# Patient Record
Sex: MALE | Race: WHITE | NOT HISPANIC OR LATINO | Employment: OTHER | URBAN - METROPOLITAN AREA
[De-identification: names, ages, dates, MRNs, and addresses within clinical notes are randomized per-mention and may not be internally consistent; named-entity substitution may affect disease eponyms.]

---

## 2022-04-11 ENCOUNTER — HISTORICAL (OUTPATIENT)
Dept: ADMINISTRATIVE | Facility: HOSPITAL | Age: 67
End: 2022-04-11

## 2022-04-29 VITALS
DIASTOLIC BLOOD PRESSURE: 80 MMHG | WEIGHT: 222.88 LBS | BODY MASS INDEX: 33.78 KG/M2 | SYSTOLIC BLOOD PRESSURE: 124 MMHG | HEIGHT: 68 IN

## 2022-05-24 DIAGNOSIS — M51.36 DEGENERATION, INTERVERTEBRAL DISC, LUMBAR: Primary | ICD-10-CM

## 2022-06-09 ENCOUNTER — OFFICE VISIT (OUTPATIENT)
Dept: ORTHOPEDICS | Facility: CLINIC | Age: 67
End: 2022-06-09
Payer: OTHER GOVERNMENT

## 2022-06-09 VITALS
WEIGHT: 215 LBS | HEIGHT: 69 IN | HEART RATE: 78 BPM | BODY MASS INDEX: 31.84 KG/M2 | SYSTOLIC BLOOD PRESSURE: 142 MMHG | DIASTOLIC BLOOD PRESSURE: 77 MMHG

## 2022-06-09 DIAGNOSIS — M54.9 CHRONIC BACK PAIN GREATER THAN 3 MONTHS DURATION: Primary | ICD-10-CM

## 2022-06-09 DIAGNOSIS — G89.29 CHRONIC BACK PAIN GREATER THAN 3 MONTHS DURATION: Primary | ICD-10-CM

## 2022-06-09 DIAGNOSIS — M47.816 LUMBAR SPONDYLOSIS: ICD-10-CM

## 2022-06-09 DIAGNOSIS — M51.36 DEGENERATION, INTERVERTEBRAL DISC, LUMBAR: ICD-10-CM

## 2022-06-09 PROBLEM — M51.369 DEGENERATION, INTERVERTEBRAL DISC, LUMBAR: Status: ACTIVE | Noted: 2022-06-09

## 2022-06-09 PROCEDURE — 96372 THER/PROPH/DIAG INJ SC/IM: CPT | Mod: ,,, | Performed by: ANESTHESIOLOGY

## 2022-06-09 PROCEDURE — 96372 PR INJECTION,THERAP/PROPH/DIAG2ST, IM OR SUBCUT: ICD-10-PCS | Mod: ,,, | Performed by: ANESTHESIOLOGY

## 2022-06-09 PROCEDURE — 99203 PR OFFICE/OUTPT VISIT, NEW, LEVL III, 30-44 MIN: ICD-10-PCS | Mod: 25,,, | Performed by: ANESTHESIOLOGY

## 2022-06-09 PROCEDURE — 99203 OFFICE O/P NEW LOW 30 MIN: CPT | Mod: 25,,, | Performed by: ANESTHESIOLOGY

## 2022-06-09 RX ORDER — GLIPIZIDE 10 MG/1
10 TABLET ORAL
COMMUNITY

## 2022-06-09 RX ORDER — KETOROLAC TROMETHAMINE 30 MG/ML
60 INJECTION, SOLUTION INTRAMUSCULAR; INTRAVENOUS
Status: COMPLETED | OUTPATIENT
Start: 2022-06-09 | End: 2022-06-09

## 2022-06-09 RX ORDER — METHOCARBAMOL 750 MG/1
750 TABLET, FILM COATED ORAL EVERY 8 HOURS PRN
COMMUNITY
Start: 2022-03-02 | End: 2023-11-20

## 2022-06-09 RX ADMIN — KETOROLAC TROMETHAMINE 60 MG: 30 INJECTION, SOLUTION INTRAMUSCULAR; INTRAVENOUS at 03:06

## 2022-06-09 NOTE — PROGRESS NOTES
Pauline Lala MD        PATIENT NAME: Wen Morris  : 1955  DATE: 22  MRN: [id number]      Billing Provider: Pauline Lala MD  Level of Service:   Patient PCP Information     Provider PCP Type    Naya Whittaker MD General          Reason for Visit / Chief Complaint: Back Pain ([facility]; [facility]. Pt states prior injury/sx; neck sx 22. Pt c/o constant pain; wears back brace daily. Pain level 6 out of 10. Pt did not bring list/meds. )       Update PCP  Update Chief Complaint         History of Present Illness / Problem Focused Workflow     Wen Morris presents to the clinic with Back Pain ([facility]; [facility] back pain. Pt states prior injury/sx; neck sx 22. Pt c/o constant pain; wears back brace daily. Pain level 6 out of 10. Pt did not bring list/meds. )     LBP for many years; had MELI years ago but steroid caused elevated glucose difficult to control  Last had toradol IM that helped for a couple of months    Back Pain        Review of Systems     Review of Systems   Musculoskeletal: Positive for back pain.   All other systems reviewed and are negative.       Medical / Social / Family History   History reviewed. No pertinent past medical history.    History reviewed. No pertinent surgical history.    Social History  Mr. Morris  reports that he has quit smoking. His smoking use included cigarettes. He has never used smokeless tobacco.    Family History  Mr.'s Morris family history is not on file.    Medications and Allergies     Medications  Outpatient Medications Marked as Taking for the 22 encounter (Office Visit) with Pauline Lala MD   Medication Sig Dispense Refill    glipiZIDE (GLUCOTROL) 10 MG tablet Take 10 mg by mouth 2 (two) times daily before meals.      methocarbamoL (ROBAXIN) 750 MG Tab Take 750 mg by mouth every 8 (eight) hours as needed.         Allergies  Review of patient's allergies indicates:   Allergen Reactions    Corticosteroids  (glucocorticoids) Anxiety       Physical Examination     Vitals:    06/09/22 1458   BP: (!) 142/77   Pulse: 78     Spine Musculoskeletal Exam    General        Constitutional: appears stated age, well-developed and well-nourished    Scleral icterus: no    Labored breathing: no    Psychiatric: normal mood and affect and no acute distress    Neurological: alert and oriented x3    Skin: intact    Inspection      Leg length disparity: no discrepancy      Thoracolumbar      Erythema: none    Edema (right lower extremity): none    Edema (left lower extremity): none    Ecchymosis: none    Deformity: none    Palpation      Thoracolumbar      Masses: none    Spasms: none    Crepitus: none    Lower extremity muscle tone: normal       Assessment and Plan (including Health Maintenance)      Problem List  Smart Sets  Document Outside HM   :    Plan:   Chronic back pain greater than 3 months duration    Degeneration, intervertebral disc, lumbar  -     Ambulatory referral/consult to Orthopedics    Lumbar spondylosis       Pt states he cannot have steroid injections due to glucose being difficult to control. Toradol IM today, which has given 2 months of relief in past. Will f/u 2 months.    Problem List Items Addressed This Visit        Orthopedic Problems    Degeneration, intervertebral disc, lumbar    Lumbar spondylosis       Other    Chronic back pain greater than 3 months duration - Primary            Future Appointments   Date Time Provider Department Center   8/11/2022  1:15 PM Pauline Lala MD [facility]        There are no Patient Instructions on file for this visit.  No follow-ups on file.     Signature:  Pauline Lala MD      Date of encounter: 6/9/22

## 2022-08-11 ENCOUNTER — OFFICE VISIT (OUTPATIENT)
Dept: ORTHOPEDICS | Facility: CLINIC | Age: 67
End: 2022-08-11
Payer: OTHER GOVERNMENT

## 2022-08-11 VITALS
DIASTOLIC BLOOD PRESSURE: 88 MMHG | BODY MASS INDEX: 32.49 KG/M2 | SYSTOLIC BLOOD PRESSURE: 136 MMHG | HEIGHT: 68 IN | HEART RATE: 74 BPM | WEIGHT: 214.38 LBS

## 2022-08-11 DIAGNOSIS — M54.16 LUMBAR RADICULITIS: ICD-10-CM

## 2022-08-11 DIAGNOSIS — G89.29 CHRONIC BACK PAIN GREATER THAN 3 MONTHS DURATION: Primary | ICD-10-CM

## 2022-08-11 DIAGNOSIS — M51.36 LUMBAR DEGENERATIVE DISC DISEASE: ICD-10-CM

## 2022-08-11 DIAGNOSIS — M47.816 LUMBAR SPONDYLOSIS: ICD-10-CM

## 2022-08-11 DIAGNOSIS — M54.9 CHRONIC BACK PAIN GREATER THAN 3 MONTHS DURATION: Primary | ICD-10-CM

## 2022-08-11 PROBLEM — M51.369 LUMBAR DEGENERATIVE DISC DISEASE: Status: ACTIVE | Noted: 2022-08-11

## 2022-08-11 PROCEDURE — 99213 PR OFFICE/OUTPT VISIT, EST, LEVL III, 20-29 MIN: ICD-10-PCS | Mod: 25,,, | Performed by: ANESTHESIOLOGY

## 2022-08-11 PROCEDURE — 99213 OFFICE O/P EST LOW 20 MIN: CPT | Mod: 25,,, | Performed by: ANESTHESIOLOGY

## 2022-08-11 RX ORDER — KETOROLAC TROMETHAMINE 30 MG/ML
30 INJECTION, SOLUTION INTRAMUSCULAR; INTRAVENOUS
Status: COMPLETED | OUTPATIENT
Start: 2022-08-11 | End: 2022-08-12

## 2022-08-11 NOTE — PROGRESS NOTES
Pauline Lala MD        PATIENT NAME: Wen Morris  : 1955  DATE: 22  MRN: [id number]      Billing Provider: Pauline Lala MD  Level of Service:   Patient PCP Information     Provider PCP Type    Naya Whittaker MD General          Reason for Visit / Chief Complaint: Back Pain (Pt states the shot worked very well for 5 days and after that he is experiencing a lot of pain. Describes the pain as a stabbing pain.  )       Update PCP  Update Chief Complaint         History of Present Illness / Problem Focused Workflow     Wen Morris presents to the clinic with Back Pain (Pt states the shot worked very well for 5 days and after that he is experiencing a lot of pain. Describes the pain as a stabbing pain.  )     This is a 67-year-old male who returns to clinic today for follow-up of his chronic low back pain.  He was last seen here a couple months ago at which time he received a Toradol intramuscular injection.  He states that it worked for about a week, but that all the pain returned.  He previously had a Toradol intramuscular injection that lasted for a couple of months.  He has been trying to avoid epidural steroid injections because they caused his glucose to elevated it was difficult to control.  He is taking Lyrica, Robaxin, and naproxen but states they do not help.  [facility]      Back Pain        Review of Systems     Review of Systems   Musculoskeletal: Positive for back pain.   All other systems reviewed and are negative.       Medical / Social / Family History   History reviewed. No pertinent past medical history.    History reviewed. No pertinent surgical history.    Social History  Mr. Morris  reports that he has quit smoking. His smoking use included cigarettes. He has never used smokeless tobacco.    Family History  Mr.'s Morris family history is not on file.    Medications and Allergies     Medications  No outpatient  medications have been marked as taking for the 8/11/22 encounter (Office Visit) with Pauline Lala MD.       Allergies  Review of patient's allergies indicates:   Allergen Reactions    Corticosteroids (glucocorticoids) Anxiety       Physical Examination     Vitals:    08/11/22 1306   BP: 136/88   Pulse: 74     Spine Musculoskeletal Exam    General        Constitutional: appears stated age, well-developed and well-nourished    Scleral icterus: no    Labored breathing: no    Psychiatric: normal mood and affect and no acute distress    Neurological: alert and oriented x3    Skin: intact    Inspection      Leg length disparity: no discrepancy      Thoracolumbar      Erythema: none    Edema (right lower extremity): none    Edema (left lower extremity): none    Ecchymosis: none    Deformity: none    Palpation      Thoracolumbar      Masses: none    Spasms: none    Crepitus: none    Lower extremity muscle tone: normal       Assessment and Plan (including Health Maintenance)      Problem List  Smart Sets  Document Outside HM   :    Plan:   Chronic back pain greater than 3 months duration    Lumbar spondylosis    Lumbar radiculitis    Lumbar degenerative disc disease       He will receive a Toradol intramuscular injection in the office today for his flare-up of low back pain.  He is supposed to start physical therapy soon.  I will follow up with him upon completion of his physical therapy in 3 months.    Problem List Items Addressed This Visit        Orthopedic Problems    Lumbar spondylosis    Lumbar degenerative disc disease       Other    Chronic back pain greater than 3 months duration - Primary    Lumbar radiculitis            No future appointments.     There are no Patient Instructions on file for this visit.  No follow-ups on file.     Signature:  Pauline Lala MD      Date of encounter: 8/11/22

## 2022-08-12 PROCEDURE — 96372 PR INJECTION,THERAP/PROPH/DIAG2ST, IM OR SUBCUT: ICD-10-PCS | Mod: ,,, | Performed by: ANESTHESIOLOGY

## 2022-08-12 PROCEDURE — 96372 THER/PROPH/DIAG INJ SC/IM: CPT | Mod: ,,, | Performed by: ANESTHESIOLOGY

## 2022-08-12 RX ADMIN — KETOROLAC TROMETHAMINE 30 MG: 30 INJECTION, SOLUTION INTRAMUSCULAR; INTRAVENOUS at 08:08

## 2022-09-27 ENCOUNTER — TELEPHONE (OUTPATIENT)
Dept: NEUROLOGY | Facility: CLINIC | Age: 67
End: 2022-09-27
Payer: OTHER GOVERNMENT

## 2022-09-27 NOTE — TELEPHONE ENCOUNTER
Message from Carol Bernard sent at 9/27/2022  1:25 PM CDT   Regarding: RE: Appointment  LVM & s/w pt's nephew - he will let him know an appt was scheduled and to contact me if needed.      ----- Message -----  From: Kala Marina  Sent: 9/27/2022  11:58 AM CDT  To: Carol Bernard  Subject: Appointment                                      [facility] with Dr. Schwab.     Patient is already established with Dr. Schwab; last seen in 03/2021 for dx: chronic neck pain; needs follow up scheduled.     Authorization has been added to patients chart and scanned into  as well.     Thank you.

## 2022-10-20 ENCOUNTER — TELEPHONE (OUTPATIENT)
Dept: NEUROLOGY | Facility: CLINIC | Age: 67
End: 2022-10-20
Payer: OTHER GOVERNMENT

## 2022-10-20 NOTE — TELEPHONE ENCOUNTER
Pt states he was told by [facility] a records request would need to be sent to [facility] before they can send clinic the CT scan for pt's visit on 10/21/22.    I called the [facility] with number provided by pt [phone].   I spoke to Bella.   Bella states a request can be faxed to [phone].   States request should include a letter head, office address, phone #, fax #, pt name, , what is being requested, reason for request and should be signed.   States they can fax back a report by tomorrow 10/21/22, but not the CD.   States if CD is needed it will have to mailed, but it will not be in by tomorrow 10/21/22.   States pt has option to go into a [facility] location, fill out request and get report today 10/20/22.     I called pt to notify him of [facility] call.   Pt states he will go in tomorrow morning 10/21/22, do request and  report.       LOV: 3/10/21    NOV: 10/21/22

## 2022-10-20 NOTE — TELEPHONE ENCOUNTER
Nothing at the moment.   Pt stated he will go in tomorrow morning 10/21/22, do request and  report from [facility] in [place].

## 2022-10-21 ENCOUNTER — OFFICE VISIT (OUTPATIENT)
Dept: NEUROLOGY | Facility: CLINIC | Age: 67
End: 2022-10-21
Payer: OTHER GOVERNMENT

## 2022-10-21 VITALS
SYSTOLIC BLOOD PRESSURE: 136 MMHG | WEIGHT: 223.19 LBS | BODY MASS INDEX: 33.06 KG/M2 | HEIGHT: 69 IN | DIASTOLIC BLOOD PRESSURE: 72 MMHG

## 2022-10-21 DIAGNOSIS — M54.81 OCCIPITAL NEURALGIA, UNSPECIFIED LATERALITY: Primary | ICD-10-CM

## 2022-10-21 PROCEDURE — 64450 PR NERVE BLOCK INJ, ANES/STEROID, OTHER PERIPHERAL: ICD-10-PCS | Mod: S$PBB,LT,, | Performed by: PSYCHIATRY & NEUROLOGY

## 2022-10-21 PROCEDURE — 99214 OFFICE O/P EST MOD 30 MIN: CPT | Mod: PBBFAC | Performed by: PSYCHIATRY & NEUROLOGY

## 2022-10-21 PROCEDURE — 64405 NJX AA&/STRD GR OCPL NRV: CPT | Mod: PBBFAC,LT | Performed by: PSYCHIATRY & NEUROLOGY

## 2022-10-21 PROCEDURE — 64450 NJX AA&/STRD OTHER PN/BRANCH: CPT | Mod: PBBFAC,LT | Performed by: PSYCHIATRY & NEUROLOGY

## 2022-10-21 PROCEDURE — 64405 NJX AA&/STRD GR OCPL NRV: CPT | Mod: 51,S$PBB,LT, | Performed by: PSYCHIATRY & NEUROLOGY

## 2022-10-21 PROCEDURE — 99999 PR PBB SHADOW E&M-EST. PATIENT-LVL IV: CPT | Mod: PBBFAC,,, | Performed by: PSYCHIATRY & NEUROLOGY

## 2022-10-21 PROCEDURE — 99213 OFFICE O/P EST LOW 20 MIN: CPT | Mod: 25,S$PBB,, | Performed by: PSYCHIATRY & NEUROLOGY

## 2022-10-21 PROCEDURE — 99999 PR PBB SHADOW E&M-EST. PATIENT-LVL IV: ICD-10-PCS | Mod: PBBFAC,,, | Performed by: PSYCHIATRY & NEUROLOGY

## 2022-10-21 PROCEDURE — 64405 PR NERVE BLOCK INJ, ANES/STEROID, OCCIPITAL: ICD-10-PCS | Mod: 51,S$PBB,LT, | Performed by: PSYCHIATRY & NEUROLOGY

## 2022-10-21 PROCEDURE — 64450 NJX AA&/STRD OTHER PN/BRANCH: CPT | Mod: S$PBB,LT,, | Performed by: PSYCHIATRY & NEUROLOGY

## 2022-10-21 PROCEDURE — 99213 PR OFFICE/OUTPT VISIT, EST, LEVL III, 20-29 MIN: ICD-10-PCS | Mod: 25,S$PBB,, | Performed by: PSYCHIATRY & NEUROLOGY

## 2022-10-21 RX ORDER — BUPIVACAINE HYDROCHLORIDE 5 MG/ML
1 INJECTION, SOLUTION PERINEURAL ONCE
Status: COMPLETED | OUTPATIENT
Start: 2022-10-21 | End: 2022-10-21

## 2022-10-21 RX ORDER — IBUPROFEN 100 MG/5ML
1000 SUSPENSION, ORAL (FINAL DOSE FORM) ORAL DAILY
COMMUNITY

## 2022-10-21 RX ORDER — DIVALPROEX SODIUM 250 MG/1
250 TABLET, DELAYED RELEASE ORAL NIGHTLY
COMMUNITY
Start: 2022-09-26 | End: 2023-01-09

## 2022-10-21 RX ORDER — MEMANTINE HYDROCHLORIDE 10 MG/1
5 TABLET ORAL 2 TIMES DAILY
COMMUNITY

## 2022-10-21 RX ORDER — TRIAMCINOLONE ACETONIDE 40 MG/ML
40 INJECTION, SUSPENSION INTRA-ARTICULAR; INTRAMUSCULAR
Status: COMPLETED | OUTPATIENT
Start: 2022-10-21 | End: 2022-10-21

## 2022-10-21 RX ORDER — ZOLPIDEM TARTRATE 10 MG/1
5 TABLET ORAL NIGHTLY
COMMUNITY

## 2022-10-21 RX ORDER — ENTECAVIR 0.5 MG/1
0.5 TABLET, FILM COATED ORAL DAILY
COMMUNITY

## 2022-10-21 RX ORDER — AMOXICILLIN AND CLAVULANATE POTASSIUM 875; 125 MG/1; MG/1
1 TABLET, FILM COATED ORAL EVERY 12 HOURS
COMMUNITY
Start: 2022-10-16 | End: 2023-01-09

## 2022-10-21 RX ORDER — DONEPEZIL HYDROCHLORIDE 10 MG/1
10 TABLET, FILM COATED ORAL NIGHTLY
COMMUNITY

## 2022-10-21 RX ORDER — ASPIRIN 81 MG/1
81 TABLET ORAL DAILY
COMMUNITY
End: 2023-01-09

## 2022-10-21 RX ORDER — TENOFOVIR DISOPROXIL FUMARATE 300 MG/1
300 TABLET, FILM COATED ORAL DAILY
COMMUNITY

## 2022-10-21 RX ORDER — OMEPRAZOLE 20 MG/1
20 CAPSULE, DELAYED RELEASE ORAL DAILY
COMMUNITY

## 2022-10-21 RX ORDER — HYDROCODONE BITARTRATE AND ACETAMINOPHEN 7.5; 325 MG/1; MG/1
1 TABLET ORAL 3 TIMES DAILY PRN
COMMUNITY
Start: 2022-10-19 | End: 2023-01-09

## 2022-10-21 RX ORDER — CYCLOBENZAPRINE HCL 10 MG
10 TABLET ORAL 3 TIMES DAILY PRN
COMMUNITY

## 2022-10-21 RX ORDER — NAPROXEN 500 MG/1
500 TABLET ORAL 2 TIMES DAILY WITH MEALS
COMMUNITY
End: 2023-11-10

## 2022-10-21 RX ORDER — FLUOXETINE HYDROCHLORIDE 20 MG/1
20 CAPSULE ORAL DAILY
COMMUNITY

## 2022-10-21 RX ORDER — CHLORPHENIRAMINE MALEATE 4 MG
4 TABLET ORAL EVERY 6 HOURS PRN
COMMUNITY

## 2022-10-21 RX ADMIN — TRIAMCINOLONE ACETONIDE 40 MG: 40 INJECTION, SUSPENSION INTRA-ARTICULAR; INTRAMUSCULAR at 12:10

## 2022-10-21 RX ADMIN — BUPIVACAINE HYDROCHLORIDE 5 MG: 5 INJECTION, SOLUTION PERINEURAL at 12:10

## 2022-10-21 NOTE — PROGRESS NOTES
Subjective:       Patient ID: Wen Morris is a 67 y.o. male.    Chief Complaint: Headache (C/o daily HA; [facility]) and Chronic Neck Pain    HPI    Neck pain resolved after neck surgery  Chronic, daily headaches L occiput, sharp; can trigger migraine  Largely stopped nsaids  Headaches preceded tenofivir; has been having this for decades  Review of Systems    The remainder of the 14 system ROS is noncontributory or negative unless mentioned/reviewed above.    Objective:      Physical Exam  left greater/lesser ON tender  Mental Status: Alert and oriented x3. Language is fluent with good comprehension.    Cranial Nerve: Pupils are equal, round, and reactive to light. Visual fields are intact to confrontation. Normal fundi. Ocular movements are intact. Face is symmetric at rest and with activation with intact sensation throughout. Hearing intact to finger rub bilaterally. Muscles of tongue and palate activate symmetrically. No dysarthria. Strength is full in sternocleidomastoid and trapezius bilaterally.    Motor: Muscle bulk and tone are normal. Strength is 5/5 in all four extremities both proximally and distally. Intact fine motor movements bilaterally. There is no pronator drift or satelliting on arm roll.    Sensory: Sensation is intact to light touch, pinprick, vibration, and proprioception throughout. Romberg is negative.    Reflexes: 2+ and symmetric at the biceps, triceps, brachioradialis, patella, and Achilles bilaterally. Plantar response is flexor bilaterally.    Coordination: No dysmetria on finger-nose-finger or heel-knee-shin. Normal rapid alternating movements. Fast finger tapping with normal amplitude and speed.    Gait: Narrow based with normal stride length and good arm swing bilaterally. Able to walk on heels, toes, and in tandem.      PROCEDURE NOTE:   Left greater and lesser ONB done with 1 cc kenalog and 1 c cmarcarine total used  Assessment:       Problem List Items Addressed This Visit     None  Visit Diagnoses       Occipital neuralgia, unspecified laterality    -  Primary              Plan:       oNB PRN

## 2022-11-11 ENCOUNTER — OFFICE VISIT (OUTPATIENT)
Dept: ORTHOPEDICS | Facility: CLINIC | Age: 67
End: 2022-11-11
Payer: OTHER GOVERNMENT

## 2022-11-11 DIAGNOSIS — M51.36 DEGENERATION, INTERVERTEBRAL DISC, LUMBAR: Primary | ICD-10-CM

## 2022-11-11 DIAGNOSIS — G89.29 CHRONIC BACK PAIN GREATER THAN 3 MONTHS DURATION: ICD-10-CM

## 2022-11-11 DIAGNOSIS — M54.9 CHRONIC BACK PAIN GREATER THAN 3 MONTHS DURATION: ICD-10-CM

## 2022-11-11 DIAGNOSIS — M47.816 LUMBAR SPONDYLOSIS: ICD-10-CM

## 2022-11-11 DIAGNOSIS — M54.16 LUMBAR RADICULITIS: ICD-10-CM

## 2022-11-11 DIAGNOSIS — M51.36 LUMBAR DEGENERATIVE DISC DISEASE: ICD-10-CM

## 2022-11-11 PROCEDURE — 96372 THER/PROPH/DIAG INJ SC/IM: CPT | Mod: ,,, | Performed by: ANESTHESIOLOGY

## 2022-11-11 PROCEDURE — 96372 PR INJECTION,THERAP/PROPH/DIAG2ST, IM OR SUBCUT: ICD-10-PCS | Mod: ,,, | Performed by: ANESTHESIOLOGY

## 2022-11-11 PROCEDURE — 99213 OFFICE O/P EST LOW 20 MIN: CPT | Mod: 25,,, | Performed by: ANESTHESIOLOGY

## 2022-11-11 PROCEDURE — 99213 PR OFFICE/OUTPT VISIT, EST, LEVL III, 20-29 MIN: ICD-10-PCS | Mod: 25,,, | Performed by: ANESTHESIOLOGY

## 2022-11-11 RX ORDER — KETOROLAC TROMETHAMINE 30 MG/ML
30 INJECTION, SOLUTION INTRAMUSCULAR; INTRAVENOUS
Status: COMPLETED | OUTPATIENT
Start: 2022-11-11 | End: 2022-11-11

## 2022-11-11 RX ADMIN — KETOROLAC TROMETHAMINE 30 MG: 30 INJECTION, SOLUTION INTRAMUSCULAR; INTRAVENOUS at 09:11

## 2022-11-11 NOTE — PROGRESS NOTES
Pauline Lala MD        PATIENT NAME: Wen Morris  : 1955  DATE: 22  MRN: [id number]      Billing Provider: Pauline Lala MD  Level of Service:   Patient PCP Information       Provider PCP Type    Naya Whittaker MD General            Reason for Visit / Chief Complaint: Follow-up (3 month follow up for back pain. Patient states pain today is a 7/10.)       Update PCP  Update Chief Complaint         History of Present Illness / Problem Focused Workflow     Wen Morris presents to the clinic with Follow-up (3 month follow up for back pain. Patient states pain today is a 7/10.)     This is a 67-year-old male who returns to clinic today for follow-up of his chronic low back pain.  He has been trying to avoid epidural steroid injections because they caused his glucose to elevated it was difficult to control.  He is taking Lyrica, Robaxin, and naproxen but states they do not help much.  He was doing physical therapy but states that he ran out of visits.  He had received a Toradol injection at his last appointment which had been helping for quite a while, but has now worn off.  He currently rates his pain as 7/10 on the NRS, which is also the worst it gets too.  Pain had gotten down to 1/10 at best after his Toradol shot.      Back Pain    Follow-up      Review of Systems     Review of Systems   Musculoskeletal:  Positive for back pain.   All other systems reviewed and are negative.     Medical / Social / Family History     Past Medical History:   Diagnosis Date    '     Bell palsy     Chronic neck pain     Headache     Neuralgia        Past Surgical History:   Procedure Laterality Date    CHOLECYSTECTOMY         Social History  Mr. Morris  reports that he quit smoking about 6 years ago. His smoking use included cigarettes. He has never used smokeless tobacco. He reports current alcohol use of about 3.0 - 4.0 standard drinks per week. He reports that he does not use drugs.    Family  History  MrChema's Arturo family history includes Cancer in his father; Diabetes in his mother.    Medications and Allergies     Medications  Outpatient Medications Marked as Taking for the 11/11/22 encounter (Office Visit) with Pauline Lala MD   Medication Sig Dispense Refill    ALPHA LIPOIC ACID ORAL Take by mouth.      ascorbic acid, vitamin C, (VITAMIN C) 1000 MG tablet Take 1,000 mg by mouth once daily.      chlorpheniramine (CHLOR-TRIMETON) 4 mg tablet Take 4 mg by mouth every 6 (six) hours as needed for Allergies.      cyclobenzaprine (FLEXERIL) 10 MG tablet Take 10 mg by mouth 3 (three) times daily as needed for Muscle spasms.      donepeziL (ARICEPT) 10 MG tablet Take 10 mg by mouth every evening.      empagliflozin (JARDIANCE) 25 mg tablet Take 25 mg by mouth once daily.      entecavir (BARACLUDE) 0.5 MG Tab Take 0.5 mg by mouth once daily.      FLUoxetine 20 MG capsule Take 20 mg by mouth once daily.      glipiZIDE (GLUCOTROL) 10 MG tablet Take 10 mg by mouth 2 (two) times daily before meals.      memantine (NAMENDA) 10 MG Tab Take 5 mg by mouth 2 (two) times daily.      methocarbamoL (ROBAXIN) 750 MG Tab Take 750 mg by mouth every 8 (eight) hours as needed.      naproxen (NAPROSYN) 500 MG tablet Take 500 mg by mouth 2 (two) times daily with meals.      omeprazole (PRILOSEC) 20 MG capsule Take 20 mg by mouth once daily.      tenofovir disoproxil fumarate (VIREAD) 300 mg Tab Take 300 mg by mouth once daily.      zolpidem (AMBIEN) 10 mg Tab Take 5 mg by mouth nightly as needed.       Current Facility-Administered Medications for the 11/11/22 encounter (Office Visit) with Pauline Lala MD   Medication Dose Route Frequency Provider Last Rate Last Admin    [COMPLETED] ketorolac injection 30 mg  30 mg Intramuscular 1 time in Clinic/HOD Pauline Lala MD   30 mg at 11/11/22 0948       Allergies  Review of patient's allergies indicates:   Allergen Reactions    Corticosteroids (glucocorticoids) Anxiety        Physical Examination     There were no vitals filed for this visit.    Spine Musculoskeletal Exam    Inspection    Leg length disparity: no discrepancy    Thoracolumbar    Erythema: none    Edema (right lower extremity): none    Edema (left lower extremity): none    Ecchymosis: none    Deformity: none    General      Constitutional: appears stated age, well-developed and well-nourished    Scleral icterus: no    Labored breathing: no    Psychiatric: normal mood and affect and no acute distress    Neurological: alert and oriented x3    Skin: intact     Assessment and Plan (including Health Maintenance)      Problem List  Smart Sets  Document Outside HM   :    Plan:   Degeneration, intervertebral disc, lumbar  -     ketorolac injection 30 mg    Lumbar spondylosis  -     ketorolac injection 30 mg    Lumbar radiculitis  -     ketorolac injection 30 mg    Lumbar degenerative disc disease  -     ketorolac injection 30 mg    Chronic back pain greater than 3 months duration  -     ketorolac injection 30 mg     He will receive a Toradol intramuscular injection in the office today for his flare-up of low back pain.  F/u in 4 months.    Problem List Items Addressed This Visit          Orthopedic Problems    Degeneration, intervertebral disc, lumbar - Primary    Lumbar spondylosis    Lumbar degenerative disc disease       Other    Chronic back pain greater than 3 months duration    Lumbar radiculitis         Future Appointments   Date Time Provider Department Center   3/9/2023  9:45 AM Pauline Lala MD [facility]        There are no Patient Instructions on file for this visit.  No follow-ups on file.     Signature:  Pauline Lala MD      Date of encounter: 11/11/22

## 2023-01-05 ENCOUNTER — TELEPHONE (OUTPATIENT)
Dept: NEUROLOGY | Facility: CLINIC | Age: 68
End: 2023-01-05
Payer: OTHER GOVERNMENT

## 2023-01-05 NOTE — TELEPHONE ENCOUNTER
Pt reports his headaches have returned  States headaches started two and a half weeks ago, are located towards the back of his head and last all day.   Should pt follow up with MD or NP?   I see Dr. Schwab's next available is 2/20/23.      LOV: 10/21/22    NOV: none

## 2023-01-09 ENCOUNTER — CLINICAL SUPPORT (OUTPATIENT)
Dept: NEUROLOGY | Facility: CLINIC | Age: 68
End: 2023-01-09
Payer: OTHER GOVERNMENT

## 2023-01-09 DIAGNOSIS — M54.81 OCCIPITAL NEURALGIA OF LEFT SIDE: Primary | ICD-10-CM

## 2023-01-09 PROCEDURE — 64405 PR NERVE BLOCK INJ, ANES/STEROID, OCCIPITAL: ICD-10-PCS | Mod: 51,S$PBB,LT, | Performed by: NURSE PRACTITIONER

## 2023-01-09 PROCEDURE — 64450 PR NERVE BLOCK INJ, ANES/STEROID, OTHER PERIPHERAL: ICD-10-PCS | Mod: S$PBB,LT,, | Performed by: NURSE PRACTITIONER

## 2023-01-09 PROCEDURE — 99999 PR PBB SHADOW E&M-EST. PATIENT-LVL III: CPT | Mod: PBBFAC,,, | Performed by: NURSE PRACTITIONER

## 2023-01-09 PROCEDURE — 64450 NJX AA&/STRD OTHER PN/BRANCH: CPT | Mod: PBBFAC,LT | Performed by: NURSE PRACTITIONER

## 2023-01-09 PROCEDURE — 99999 PR PBB SHADOW E&M-EST. PATIENT-LVL III: ICD-10-PCS | Mod: PBBFAC,,, | Performed by: NURSE PRACTITIONER

## 2023-01-09 PROCEDURE — 64450 NJX AA&/STRD OTHER PN/BRANCH: CPT | Mod: S$PBB,LT,, | Performed by: NURSE PRACTITIONER

## 2023-01-09 PROCEDURE — 96372 THER/PROPH/DIAG INJ SC/IM: CPT | Mod: PBBFAC

## 2023-01-09 PROCEDURE — 64405 NJX AA&/STRD GR OCPL NRV: CPT | Mod: 51,S$PBB,LT, | Performed by: NURSE PRACTITIONER

## 2023-01-09 RX ORDER — TRIAMCINOLONE ACETONIDE 40 MG/ML
20 INJECTION, SUSPENSION INTRA-ARTICULAR; INTRAMUSCULAR
Status: COMPLETED | OUTPATIENT
Start: 2023-01-09 | End: 2023-01-09

## 2023-01-09 RX ORDER — BUPIVACAINE HYDROCHLORIDE 5 MG/ML
0.5 INJECTION, SOLUTION EPIDURAL; INTRACAUDAL
Status: COMPLETED | OUTPATIENT
Start: 2023-01-09 | End: 2023-01-09

## 2023-01-09 RX ADMIN — BUPIVACAINE HYDROCHLORIDE 2.5 MG: 5 INJECTION, SOLUTION EPIDURAL; INTRACAUDAL at 08:01

## 2023-01-09 RX ADMIN — TRIAMCINOLONE ACETONIDE 20 MG: 40 INJECTION, SUSPENSION INTRA-ARTICULAR; INTRAMUSCULAR at 08:01

## 2023-01-09 NOTE — PROGRESS NOTES
"PROCEDURE:   OCCIPITAL NERVE BLOCK for L occipital neuralgia    L lesser occipital pain & headache returned about 3 weeks ago    L occiput ttp    Requesting onb today    Procedure:  The patient was placed in a seated position. The site of pain and procedure were confirmed with the patient prior to starting the procedure.   The patient's nuchal ridge of the occipital bone was identified and cleaned with alcohol swab.   A 25 gauge 5/8" was advanced through the skin and subcutaneous tissues in the area of the leftlesser occipital nerves.    Aspiration for blood and CSF was negative.      A total of 0.5ml of Bupivacaine 0.5% and 20 mg kenalog was injected.    There were no signs or symptoms of intravascular injection.     No complications were evident.   No specimens collected.    Dr. Liborio Schwab was physically present in the suite if assistance was needed.        "

## 2023-01-09 NOTE — PATIENT INSTRUCTIONS
"               OCCIPITAL NEURALGIA  Neuralgia is a form of neuropathic pain that is characterized by the following features [1-3]:  ?Paroxysmal, brief (seconds to a few minutes), shock- or lightning-like pain that follows a peripheral or cranial nerve distribution and can spread to adjacent areas in the course of the attack.  ?By definition, no objective neurologic deficits are found in the distribution of the affected nerve. (See "Overview of craniofacial pain".)  ?Attacks can be provoked by nonpainful stimulation (allodynia) of trigger points or zones.  ?A refractory period follows attacks; the duration of the refractory period shortens as the disease progresses.  Occipital neuralgia can be a cause of headache in the occipital region. It is described as a unilateral or bilateral paroxysmal shooting or stabbing pain in the posterior part of the scalp, involving the greater, lesser, and/or third occipital nerve distribution, sometimes accompanied by diminished sensation or dysesthesia in the affected area, and commonly associated with tenderness over the involved nerve or nerves [4,5].  This topic will review clinical aspects of occipital neuralgia. Other cranial neuralgias and central causes of facial pain are discussed separately. (See "Overview of craniofacial pain" and "Central neuropathic facial pain" and "Cold stimulus headache" and "Nervus intermedius neuralgia" and "Nummular headache" and "Trigeminal neuralgia".)  ANATOMY -- Sensation in the posterior head and neck is supplied by medial branches of dorsal primary rami of cervical nerve roots C2, C3, C4, and occasionally C5 (figure 1).  The greater occipital nerve originates from the second cervical nerve, perforates the semispinalis capitis and trapezius muscles, and supplies sensation to the uppermost neck and posterior head from the suboccipital area to the vertex.  The third occipital nerve originates from the third cervical spinal nerve, perforates the " trapezius muscle, and supplies sensation to the upper posterior neck and lower occipital region of the scalp, medial to the greater occipital nerve territory.  The lesser occipital nerve is formed by divisions of the second and third cervical nerves, ascends along the posterior margin of the sternocleidomastoid muscle, and provides sensory fibers to the area of the scalp lateral to the greater occipital nerve, behind and superior to the auricle [6-8].  PATHOPHYSIOLOGY -- The pathophysiology of occipital neuralgia is uncertain. One hypothesis is that occipital neuralgia results from injury to the C2-C3 nerve roots and/or occipital nerves through different mechanisms (chronic instability, entrapment, trauma, inflammation) [9]. Some cases develop following Arnold-Chiari malformation surgery or other surgeries at the craniocervical junction and upper cervical spine [10,11]. Some have proposed vascular compression of the nerve by the occipital artery [12-14].  The currently accepted view is that this disorder results from the chronic entrapment of the occipital nerves by the posterior neck and scalp muscles [15-17]. However, there is no compelling evidence to support the concept of irritation of the occipital nerves as the cause [15]. In addition, there is no indisputable evidence from surgical studies to support entrapment of the occipital nerves as the etiology.  Most often, occipital neuralgia develops spontaneously.  EPIDEMIOLOGY -- Occipital neuralgia is thought by many nonspecialists to be common, but it is a relatively rare encounter in clinical practice among headache specialists. The literature on occipital neuralgia is conflicting, and the actual incidence and prevalence are unknown [18]. This may be in part since, quite often, the diagnosis is given to patients with any pain in the occipital region even when diagnostic criteria are not met or even when the pain is not neuropathic. In a population-based study  of adults aged 55 to 94 years, the lifetime prevalence of cranial neuralgias in general was 1.6 percent [19]. In a 10-year prospective registry of patients from a headache outpatient clinic, 1.2 percent of patients were diagnosed with occipital neuralgia and most (80 percent) were female [20].  Injuries such as whiplash during motor vehicle accidents may be a risk factor for occipital neuralgia [8].  CLINICAL MANIFESTATIONS -- The pain of occipital neuralgia has a sudden onset. It is described as severe, stabbing, electric, shock-like, sharp, or shooting. It originates in the nuchal region and immediately spreads toward the vertex. The bouts of pain may start spontaneously or be provoked by specific maneuvers such as brushing the hair, exposing to cold, or moving the neck. The pain is usually unilateral, but bilateral cases occur as well. A dull occipital discomfort may be present during periods between the painful paroxysms. In a case series of 68 patients with occipital neuralgia, baseline pain was present in 91 percent [20].  On examination, pressure, palpation, or percussion over the occipital nerve trunks may reveal local tenderness [5], trigger painful paroxysms, worsen the background discomfort, or elicit paresthesias along the distribution of the affected nerve. Percussion of the nerve often reproduces the distribution of pain (Tinel's sign).  Cervical range of motion may be restricted and local posterior neck muscle spasms may be found.  Although somewhat contradictory to the strict definition of neuralgia, occipital neuralgia is sometimes accompanied by diminished sensation or dysesthesia in the affected area [5]. The remainder of the neurologic exam is typically normal. An abnormal neurologic exam is an alert for potential alternative or underlying causes of the symptoms [8,16,17]. (See 'Differential' below.)  DIAGNOSIS -- The diagnosis of occipital neuralgia is considered when typical clinical features  are present; the diagnosis can be confirmed when pain is transiently relieved by a local occipital anesthetic block [21]. However, it is important to note that an occipital nerve block is a nonspecific intervention to which multiple other primary and secondary headache disorders may favorably respond, so symptomatic relief does not indicate a specific etiology [21].  The diagnostic criteria for occipital neuralgia from the International Classification of Headache Disorders, 3rd edition (ICHD-3) are as follows [5]:  ?(A) Unilateral or bilateral pain in the distribution(s) of the greater, lesser, and/or third occipital nerves and fulfilling criteria B through D  ?(B) Pain has at least two of the following three characteristics:  Recurring in paroxysmal attacks lasting from a few seconds to minutes  Severe intensity  Shooting, stabbing, or sharp in quality  ?(C) Pain is associated with both of the following:  Dysesthesia and/or allodynia apparent during innocuous stimulation of the scalp and/or hair  Either or both of the following:  -Tenderness over the affected nerve branches  -Trigger points at the emergence of the greater occipital nerve or in the distribution of C2  ?(D) Pain is eased temporarily by local anesthetic block of the affected nerve(s)  ?(E) Not better accounted for by another ICHD-3 diagnosis  The ICHD-3 classification requires distinguishing occipital neuralgia from occipital referral of pain from the atlantoaxial or upper zygapophyseal joints or from tender trigger points in neck muscles or their insertions [5].  By definition and diagnostic criteria, the diagnosis is not occipital neuralgia if the pain does not resolve after occipital nerve block. Thus, other causes for the neuralgiform pain (such as cervicogenic headache) should be explored if occipital nerve block fails.   Differential -- The differential diagnosis of cervical spine pain referred to the head is broad. Possible sources of such pain  "include structures innervated by the upper three cervical spinal nerves, including the following [15]:  ?Median atlantoaxial, atlanto-occipital, and lateral atlantoaxial joints  ?C2-3 zygapophyseal joint  ?Suboccipital and upper posterior neck muscles  ?Upper prevertebral muscles  ?Spinal and posterior cranial fossa dura mater  ?Vertebral arteries  ?C2-3 intervertebral disc  ?Trapezius and sternocleidomastoid muscles  Thus, occipital neuralgia must be differentiated from referred occipital pain that originates in any of these structures. Multiple structural and infiltrative lesions have been identified as causes of occipital neuralgia, including meningioma, schwannoma, arteriovenous fistula, vascular compression, pyomyositis, multiple myeloma, myelitis, C2 cavernous malformation, and C2-3 inflammatory demyelinating disease [22-30]. In most cases, the cause of occipital pain cannot be deduced on the basis of clinical features alone, thus warranting proper evaluation with neuroimaging. (See 'Evaluation' below.)  Cervicogenic headache is thought to be caused by referred pain from the upper cervical joints and is another consideration in the differential diagnosis. The pathophysiology, true source of pain, and even the existence of cervicogenic headache as a distinct clinical entity have been debated and remain somewhat controversial. This is discussed separately. (See "Cervicogenic headache".)  Evaluation -- A careful evaluation of the upper cervical spine and posterior fossa is imperative for patients with suspected occipital neuralgia [21]. There are no clear guidelines regarding the utility of neuroimaging studies for the evaluation of occipital neuralgia. However, because structural and infiltrative lesions may cause occipital neuralgia (see 'Differential' above), we suggest obtaining magnetic resonance imaging (MRI) of the head and cervical spine for new cases of suspected occipital neuralgia, and we recommend " imaging particularly if the pain is atypical or if the neurologic examination is abnormal [22].  The presence of continuous (rather than intermittent) occipital pain and the absence of impaired sensation suggest the pain is referred from some of the structures in the cervical region mentioned above [31]. (See 'Differential' above.)  Cervical medial branch blocks can provide the means to detect whether or not the neck pain is originating in one or more of the upper zygapophyseal joints [32].  TREATMENT -- Occipital nerve block (ONB) is often the initial treatment of choice for occipital neuralgia as it can potentially be both diagnostic and therapeutic. Alternative or adjunctive local and oral agents are used in patients who report partial or transient benefit from ONB. (See 'Diagnosis' above.)  Occipital nerve block -- Anesthetic nerve block (typically along with a glucocorticoid agent) helps support the diagnosis of occipital neuralgia in the right clinical context and provides immediate or partial pain relief, which may extend weeks or even months [17,33,34]. Importantly, however, not all occipital headaches occur on the basis of occipital neuralgia, and a favorable response to occipital nerve block does not equate to a diagnosis of occipital neuralgia. Other primary and secondary headache disorders may respond favorably to an occipital nerve block, which stresses the need to fulfill International Classification of Headache Disorders, 3rd edition (ICHD-3) diagnostic criteria. The procedure should be performed by physicians who are trained to perform the procedure. In one case series of 44 patients diagnosed with occipital neuralgia, administration of lidocaine or bupivacaine and 4 mg dexamethasone targeting either the greater or both greater and lesser occipital nerves showed 95 percent response rate assessed at 24 hours and at 6-month follow-up. During that time, over 80 percent of patients required no adjunctive  medication for pain control, and the mean interval to repeat injection for symptoms relief was 270 days [35]. In another single-center registry, 53 patients receiving an injection of 1% lidocaine without a corticosteroid reported a response rate of optimal pain relief in 73 percent, with more than 95 percent of patients reporting pain relief lasting two or more months [20].  ONB is usually performed by targeting tender points approximating affected branches of the C2 nerve, either the greater (DENNIS) or lesser occipital nerve (JUNIE). Injection sites are identified with surface localization using the external occipital protuberance as reference landmark with DENNIS 2 cm inferior and laterally and JUNIE 5 cm laterally [35]. Other techniques to identify injection site include using one-third the distance from the external occipital protuberance to the mastoid process or using ultrasound to identify the occipital artery as typically just lateral to the DENNIS [11,34,36,37].  The procedure can be repeated when pain recurs [8,17]. A review by the American Headache Society recommended at least three-month interval between injections if corticosteroids are used [38].   The procedure is generally safe with few complications, as long as the anesthetic/glucocorticoid injection is not inadvertently injected intravascularly [39]. Rarely, a patient may become cushingoid secondary to serial glucocorticoid-containing blocks [33,40]. In one retrospective review of 89 patients receiving a total of 315 nerve blocks, 9 percent reported adverse effects, most commonly transient dizziness or elevated blood pressure. While adverse effects were more common with bilateral injections, no difference was seen between lidocaine at 1, 2, or 5% [41].  Alternative treatment options -- Because ONBs require procedural training and provide partial or only temporary relief in some patients, medications are often used in clinical practice alone or simultaneously  "with periodic ONBs. Patients also may prefer oral agents over injections. Oral agents commonly used to treat other headache disorders including cervicogenic headache may provide pain relief alone or in concert with ONB but have not been systematically studied for this condition. Variable benefit has been observed with gabapentin and tricyclic antidepressants [8]. Pregabalin and/or baclofen have been used with variable results since other craniocervical neuralgias often respond to these [42]. Carbamazepine may relieve the pain if it is paroxysmal in nature [21,31]. (See "Cervicogenic headache".)  In the setting of acute occipital neuralgia secondary to traumatic cervical strain/sprain injury, immobilization using a neck collar for two to three weeks may provide relief of painful paroxysms [8,43]. The application of local moist heat may alleviate muscle spasm and pain. Other patients might get some relief by applying cold locally one to three times daily [8].  Patients with refractory pain -- Patients who do not respond to nerve block or to conservative measures should be evaluated in experienced centers for consideration of alternative treatments. Options include botulinum toxin type A injections, pulsed radiofrequency (PRF), or occipital nerve decompression or stimulation.  ?Botulinum toxin type A injections have suggested benefit in two small case series (n = 6 each) of patients who failed nerve blocks and oral medications [44,45]. In one, ONBs with botulinum toxin type A 50 units appeared to provide a longer duration of analgesia than diagnostic local anesthetics [44]. In the other series, botulinum toxin injected into regions traversed by the greater and lesser occipital nerves was associated with improvement in sharp/shooting pain but no improvement in dull and aching pain [45]. Ultrasound may have a role in finding specific occipital nerve entrapment locations to target with botulinum toxin injections [11]. " "Ultimately, however, large placebo-controlled trials are needed to determine whether botulinum toxin injections have a role in the management of occipital neuralgia.  ?Pulsed radiofrequency treatment is an option if local anesthetic plus glucocorticoids fail to provide sufficient relief, but conclusive evidence in support of PRF as an interventional treatment option for occipital neuralgia is lacking [46]. One trial of 81 patients with occipital neuralgia comparing PRF with occipital nerve block showed higher success rates with PRF (61 versus 36 percent; odds ratio [OR] 2.79, 95% CI 1.13-6.9) at six weeks, but this effect declined (34 versus 14 percent; OR 3.2, 95% CI 1.0-10.1) at three months and was nonsignificant at six months [47].  ?Occipital nerve surgical decompression may benefit selected patients. In a small series of 11 patients with medically refractory occipital neuralgia, decompression at the level of the semispinalis capitis and trapezial tunnel was associated with pain resolution in three, significant pain relief in six, and no pain improvement in two patients over a mean follow-up period of approximately 12 months [48]. However, this is not a routine therapeutic measure and should be reserved for use in a tertiary care center with expertise in peripheral nerve neurosurgery.  ?Occipital nerve stimulation has been employed in selected cases of severe occipital neuralgia unresponsive to less invasive measures [49-51], but this method should be reserved for use in a pain center with expertise in neuromodulation.  SOCIETY GUIDELINE LINKS -- Links to society and government-sponsored guidelines from selected countries and regions around the world are provided separately. (See "Society guideline links: Neuropathic pain".)  SUMMARY AND RECOMMENDATIONS  ?Definition - Occipital neuralgia is an uncommon cause of headache in the occipital region. It is characterized by paroxysmal jabbing pain in the greater, " lesser, and/or third occipital nerve distribution (figure 1). In most cases, the condition develops spontaneously. (See 'Anatomy' above and 'Pathophysiology' above.)  ?Clinical manifestations - The pain of occipital neuralgia is sudden in onset and described as stabbing, electric, shock-like, sharp, or shooting. It originates in the nuchal region and immediately spreads toward the vertex. Attacks may begin spontaneously or may be provoked by specific maneuvers. A dull occipital discomfort can be present during periods between the painful paroxysms. Examination may reveal local tenderness and/or hypesthesia in the territory of the affected nerve (figure 1). Percussion may elicit paresthesias along the distribution of the affected nerve. (See 'Clinical manifestations' above.)  ?Diagnosis - The diagnosis of occipital neuralgia is considered when typical clinical features are present. The diagnosis can be confirmed when pain is transiently relieved by a local occipital anesthetic block. However, other headache disorders may also favorably respond to occipital nerve blocks, so symptomatic relief does not indicate a specific etiology. (See 'Diagnosis' above.)  ?Differential diagnosis - Occipital neuralgia must be distinguished from referred occipital pain originating from atlantoaxial or upper zygapophyseal joints, tender trigger points in neck muscles, or other structures innervated by the upper three cervical spinal nerves. Secondary causes of occipital head pain include structural and infiltrative lesions. (See 'Differential' above.)  ?Imaging evaluation for underlying causes - For patients with newly diagnosed occipital neuralgia, we suggest imaging with magnetic resonance imaging of the head and cervical spine to exclude structural or infiltrative lesions, and we recommend imaging particularly for patients who have atypical pain or an abnormal neurologic examination. (See 'Evaluation' above.)  ?Treatment  Occipital  nerve block - For patients with occipital neuralgia who have moderate to severe pain or debilitating symptoms, we suggest a local occipital nerve block over other therapies (Grade 2C). Pain relief, which is typically prompt, supports the diagnosis and may last several weeks or even months. The procedure is generally safe and can be repeated when pain recurs. Other causes for the neuralgiform pain should be explored if occipital nerve block fails. (See 'Occipital nerve block' above.)  Alternative treatment options - Patients whose pain is not completely sufficiently managed with occipital nerve blocks may benefit from pharmacotherapy such as gabapentin, pregabalin, baclofen, or carbamazepine. (See 'Alternative treatment options' above.)  Patients who are refractory to conservative measures should be evaluated in experienced centers for consideration of alternative treatments (eg, botulinum toxin type A injections, pulsed radiofrequency, occipital nerve decompression, or stimulation). (See 'Patients with refractory pain' above.)

## 2023-01-10 ENCOUNTER — TELEPHONE (OUTPATIENT)
Dept: NEUROLOGY | Facility: CLINIC | Age: 68
End: 2023-01-10
Payer: OTHER GOVERNMENT

## 2023-01-10 DIAGNOSIS — M54.81 OCCIPITAL NEURALGIA OF LEFT SIDE: Chronic | ICD-10-CM

## 2023-01-10 PROBLEM — H40.89 OTHER SPECIFIED GLAUCOMA: Chronic | Status: ACTIVE | Noted: 2023-01-10

## 2023-01-10 RX ORDER — LATANOPROST 50 UG/ML
1 SOLUTION/ DROPS OPHTHALMIC NIGHTLY
COMMUNITY
Start: 2022-10-12

## 2023-01-10 RX ORDER — PREGABALIN 100 MG/1
100 CAPSULE ORAL 2 TIMES DAILY
COMMUNITY
Start: 2022-10-12 | End: 2023-07-10 | Stop reason: SDUPTHER

## 2023-01-10 RX ORDER — CARVEDILOL 25 MG/1
12.5 TABLET ORAL 2 TIMES DAILY
COMMUNITY
Start: 2022-08-09

## 2023-01-10 RX ORDER — METHYLPREDNISOLONE 4 MG/1
TABLET ORAL
Qty: 21 EACH | Refills: 0 | Status: SHIPPED | OUTPATIENT
Start: 2023-01-10 | End: 2023-08-18

## 2023-01-10 RX ORDER — CAPSAICIN 0.75 MG/G
1 CREAM TOPICAL 2 TIMES DAILY
COMMUNITY
Start: 2022-08-05

## 2023-01-10 RX ORDER — ROSUVASTATIN CALCIUM 10 MG/1
10 TABLET, COATED ORAL DAILY
COMMUNITY
Start: 2022-12-07

## 2023-01-10 NOTE — TELEPHONE ENCOUNTER
I did his onb yesterday.  This flared up several weeks ago.  Reviewed his chart for tried/failed meds...  On coreg 12.5mg bid, fluoxetine 20/day, robaxin prn, flexeril prn, naproxen prn, lyrica 100mg bid    Has glaucoma    Looks like he's taken steroids in the past    Will send medrol-dose pack in hopes of breaking the cycle      Can someone get me the name of his local pharmacy?  [facility].

## 2023-01-10 NOTE — TELEPHONE ENCOUNTER
Sent medrol dose-pack - i'm hopeful he will tolerate given the short course of it    He needs to stop taking OTC meds (like aspirin), as this can lead to medication overuse headache    He can take his prn muscle relaxers as well & those may help

## 2023-01-10 NOTE — TELEPHONE ENCOUNTER
Pt reports he has a headache that will not go away.   States he came to clinic yesterday 1/9/23 and got a shot, but still woke up with a headache.   Reports taking 2 Asprin last night 1/9/23 and 2 this morning 1/10/23.  States nothing is helping.   Pt states seeking advice on what to do.       LOV: 1/9/23    NOV: none

## 2023-02-03 ENCOUNTER — TELEPHONE (OUTPATIENT)
Dept: NEUROLOGY | Facility: CLINIC | Age: 68
End: 2023-02-03
Payer: OTHER GOVERNMENT

## 2023-02-03 NOTE — TELEPHONE ENCOUNTER
Pt reports he is still having headaches.   States nerve block received and steroid prescribed did not help to relieve headaches.  States headache have improved.   States headaches are located from the back of his head to around his upper right ear.  States he was advised by physical therapy his pinched nerve could be the cause of these headaches.   States seeking advice.       Medication: Medrol Dosepack 4 mg    [facility] [place]    LOV: 1/9/23    NOV: none

## 2023-02-03 NOTE — TELEPHONE ENCOUNTER
"Rtn call  Discussed PT's suggestion of cervical "pinched nerve"  Advised too soon to repeat steroids oral or inj.  Asked if he had contacted or seen Dr Lala recently. States saw a few months ago and has an appt in a month or two. Suggest that he be evaluated by Dr Schwab or Dr Lala.   States will keep appt with Dr Lala in March. Suggested he call and ask if he could be seen sooner.   Verbalized understanding.   "

## 2023-02-20 ENCOUNTER — TELEPHONE (OUTPATIENT)
Dept: NEUROLOGY | Facility: CLINIC | Age: 68
End: 2023-02-20
Payer: OTHER GOVERNMENT

## 2023-02-20 NOTE — TELEPHONE ENCOUNTER
Pt reports he is still having migraines.  States migraines are located in the back of his neck, lower head and just above the temples.  States 7  day steroid pack did not help relieve migraines.  States seeking advice.     LOV: 1/9/23    NOV:3/2/23  Appt given during call.

## 2023-03-02 ENCOUNTER — OFFICE VISIT (OUTPATIENT)
Dept: NEUROLOGY | Facility: CLINIC | Age: 68
End: 2023-03-02
Payer: OTHER GOVERNMENT

## 2023-03-02 VITALS
DIASTOLIC BLOOD PRESSURE: 54 MMHG | HEIGHT: 69 IN | BODY MASS INDEX: 33.03 KG/M2 | WEIGHT: 223 LBS | SYSTOLIC BLOOD PRESSURE: 118 MMHG

## 2023-03-02 DIAGNOSIS — G43.709 CHRONIC MIGRAINE WITHOUT AURA WITHOUT STATUS MIGRAINOSUS, NOT INTRACTABLE: Primary | ICD-10-CM

## 2023-03-02 DIAGNOSIS — G43.909 EPISODIC MIGRAINE: ICD-10-CM

## 2023-03-02 PROCEDURE — 99999 PR PBB SHADOW E&M-EST. PATIENT-LVL V: ICD-10-PCS | Mod: PBBFAC,,, | Performed by: NURSE PRACTITIONER

## 2023-03-02 PROCEDURE — 99213 OFFICE O/P EST LOW 20 MIN: CPT | Mod: S$PBB,,, | Performed by: NURSE PRACTITIONER

## 2023-03-02 PROCEDURE — 99999 PR PBB SHADOW E&M-EST. PATIENT-LVL V: CPT | Mod: PBBFAC,,, | Performed by: NURSE PRACTITIONER

## 2023-03-02 PROCEDURE — 99215 OFFICE O/P EST HI 40 MIN: CPT | Mod: PBBFAC | Performed by: NURSE PRACTITIONER

## 2023-03-02 PROCEDURE — 99213 PR OFFICE/OUTPT VISIT, EST, LEVL III, 20-29 MIN: ICD-10-PCS | Mod: S$PBB,,, | Performed by: NURSE PRACTITIONER

## 2023-03-02 RX ORDER — LISINOPRIL 10 MG/1
10 TABLET ORAL 2 TIMES DAILY
COMMUNITY
Start: 2022-12-07

## 2023-03-02 RX ORDER — ATOGEPANT 30 MG/1
30 TABLET ORAL DAILY
Qty: 30 TABLET | Refills: 12 | Status: SHIPPED | OUTPATIENT
Start: 2023-03-02 | End: 2023-03-03

## 2023-03-02 RX ORDER — ASPIRIN 325 MG
81 TABLET ORAL DAILY
COMMUNITY
End: 2023-08-18

## 2023-03-02 NOTE — PROGRESS NOTES
"Subjective:       Patient ID: Wen Morris is a 67 y.o. male.    Chief Complaint: Occipital Neuralgia, Migraine (C/o 8/10 migraine; hyperacusis), and Medication Management (No Med list in clinic today.  States "nothing has changed"; attempted update as accurately as possible)    HPI  On coreg 12.5mg bid, flexeril 10mg tid prn, fluoxetine, robaxin prn, lyrica 100mg bid    Prev tried: gabapentin  ONB have prev not provided relief    Tried steroids - didn't work, plus he's diabetic    Has a headache everyday, but about 50% of the time, they transform into migraine  H/o CAD & reported cva - triptans contraindicated      Review of Systems   A 14pt ROS was reviewed & is negative unless otherwise documented in the HPI  Objective:      Physical Exam    GENERAL: NAD, calm, cooperative, appropriate  Awake/alert  Well groomed  Wearing dark sunglasses  RESP: CTAB  HEART: RRR  No LE edema  MENTAL STATUS: oriented, follow commands reliably  SPEECH/LANGUAGE: clear, fluent  CN:  Perrla, eomi, vff, gaze conjugate  No tactile or motor facial asymmetry  Tongue protrudes midline  Motor: no focal weakness  Cerebellar: no tremor or dysmetria  Sensory: normal to tactile stim/vibration  DTRs: normal +2, symmetric  Gait: steady    I, melina jalloh np, certify that dr. Liborio trejo the supervising/rendering physician is physically present in the office at the time of the visit    Assessment:       Problem List Items Addressed This Visit          Other    Episodic migraine    Relevant Medications    Atogepant 30mg daily      Plan:       Try Quilipta 30mg daily   [facility] Calderon [facility]  F/u 3mo        3/3/23 Addendum @ 5010  [facility] not [facility] quilipta  Want a total of 5 preventative medication trials for at least 12 weeks each (including a CGRP Antag mAb injectable trial)    Will attempt for Nurtec QOD   If they don't approve Nurtec, will then try for Aimovig.  I discussed aimovig with the patient yesterday and he would rather not " take an injection.

## 2023-03-02 NOTE — PATIENT INSTRUCTIONS
"Migraines in Adults   The Basics   Written by the doctors and editors at [facility]   What are migraines? -- Migraines are a kind of headache that can also involve other symptoms. Migraines can affect both adults and children. They are more common in women than in men. Migraines often start off mild and then get worse.  What are the symptoms of migraines in adults? -- Symptoms can include:  Headache - The headache gets worse over several hours and is usually throbbing. It often affects 1 side of the head.  Nausea and sometimes vomiting  Feeling sensitive to light and noise - Lying down in a quiet, dark room often helps.  Aura - Some people have something called a migraine "aura." An aura is a symptom or feeling that happens before or during the migraine headache. Each person's aura is different, but in most cases the aura affects the vision. You might see flashing lights, bright spots, or zig-zag lines, or lose part of your vision. Or you might have numbness and tingling of the lips, lower face, and fingers of 1 hand. Some people hear sounds or have ringing in their ears as part of their aura. The aura usually lasts a few minutes to an hour and then goes away, but most often lasts 15 to 30 minutes.  Women who get migraines with aura usually cannot take birth control pills. That's because they might increase the risk of stroke.  Many people get other symptoms of migraine that happen several hours or even a day before the headache. Doctors call these "premonitory" or "prodromal" symptoms. They might include yawning, feeling depressed, irritability, food cravings, constipation, or a stiff neck.  Is there a test for migraines? -- No. There is no test. But your doctor should be able to tell if you have migraines by doing an exam and learning about your symptoms.  Should I see a doctor or nurse? -- Yes. If you think you are having migraines, you should talk to your doctor or nurse. You should also see a doctor or nurse if " "your migraines get worse or more frequent, or if you have new symptoms.  Is there anything I can do to prevent migraines? -- Yes. Some people find that their migraines are triggered by certain things. If you can avoid some of these things, you can lower your chances of getting migraines.  You can also keep a "headache calendar." In the calendar, write down every time you have a migraine and what you ate and did before it started. That way you can find out if there is anything you should avoid eating or doing. You can also write down what medicine you took and whether or not it helped.  Common migraine triggers include:  Stress  Hormonal changes  Skipping meals or not eating enough  Changes in the weather  Sleeping too much or too little  Bright or flashing lights  Drinking alcohol  Certain drinks or foods, such as red wine, aged cheese, and hot dogs  If your migraines are frequent or severe, your doctor can suggest others ways to help prevent them. For example, it might help to learn relaxation techniques and ways to manage stress. There are also medicines that can help.  Some women get migraines just before or during their period. Medicine can help with this, too.  How are migraines treated? -- There are many different medicines that can help with migraines. Your doctor can help you find the best treatment for your situation.  For mild migraines, your doctor might suggest an over-the-counter medicine such as acetaminophen (sample brand name: Tylenol), ibuprofen (sample brand names: Advil, Motrin), or naproxen (sample brand name: Aleve). There is also a medicine that combines acetaminophen, aspirin, and caffeine (sample brand name: Excedrin).  For more severe migraines, there are prescription medicines that can help. Some, such as medicines called triptans, help to relieve the pain from a migraine attack. Other prescription medicines can help to make migraine attacks happen less often. If you have severe nausea or " vomiting with your migraines, there are medicines that can help with that, too.   Do not try to treat frequent migraines on your own with non-prescription pain medicines. Taking non-prescription pain medicines too often can actually cause more headaches later.  What if I want to get pregnant? -- If you want to get pregnant, talk to your doctor or nurse about it before you start trying. Some medicines used to treat and prevent migraines are not safe during pregnancy, so you might need to switch medicines before you get pregnant.  Some women notice that their migraines actually get better during pregnancy and breastfeeding. This is related to hormonal changes in the body.  All topics are updated as new evidence becomes available and our peer review process is complete.  This topic retrieved from [employer] on: Sep 21, 2021.  Topic 188299 Version 5.0  Release: 29.4.2 - C29.263  © 2021 UpToDate, Inc. and/or its affiliates. All rights reserved.  Consumer Information Use and Disclaimer   This information is not specific medical advice and does not replace information you receive from your health care provider. This is only a brief summary of general information. It does NOT include all information about conditions, illnesses, injuries, tests, procedures, treatments, therapies, discharge instructions or life-style choices that may apply to you. You must talk with your health care provider for complete information about your health and treatment options. This information should not be used to decide whether or not to accept your health care provider's advice, instructions or recommendations. Only your health care provider has the knowledge and training to provide advice that is right for you. The use of this information is governed by the [employer] End User License Agreement, available at https://www.[employer]/en/solutions/BrakeQuotes.com/about/eliane.The use of [employer] content is governed by the [employer] Terms of Use. ©2021  [employer] All rights reserved.  Copyright   © 2021 [employer] and/or its affiliates. All rights reserved.

## 2023-03-08 ENCOUNTER — TELEPHONE (OUTPATIENT)
Dept: NEUROLOGY | Facility: CLINIC | Age: 68
End: 2023-03-08
Payer: OTHER GOVERNMENT

## 2023-03-08 NOTE — TELEPHONE ENCOUNTER
[facility] states the [facility] advised [facility] neurology and discuss alternative medication.  States this is about a migraine medication, but he is not sure of the name.   I see Qulipta was discussed on LOV note.   States [facility] has sent clinic a message electronically and is awaiting a response.       Medication:      Pharmacy: [facility]/ [place]    Last Appointment: 3/2/23    Next Appointment: 6/1/23

## 2023-03-09 NOTE — TELEPHONE ENCOUNTER
Rtn call - Lmsg explaining [facility] for [facility]  If Nurtec is denied, will try for one monthly injectable Aimovig.   Suggest that he f/u w Dr fidelia [facility] re medication approvals.

## 2023-03-09 NOTE — TELEPHONE ENCOUNTER
Plan:       Try Quilipta 30mg daily   Dr. Calderon is [facility]  F/u 3mo    3/3/23 Addendum @ 7578  [facility] not [facility] quilipta  Want a total of 5 preventative medication trials for at least 12 weeks each (including a CGRP Antag mAb injectable trial)     Will attempt for Nurtec QOD   If they don't approve Nurtec, will then try for Aimovig.  I discussed aimovig with the patient yesterday and he would rather not take an injection.      ... I have not heard anything back on the Nurtec Rx

## 2023-03-10 ENCOUNTER — OFFICE VISIT (OUTPATIENT)
Dept: PAIN MEDICINE | Facility: CLINIC | Age: 68
End: 2023-03-10
Payer: OTHER GOVERNMENT

## 2023-03-10 VITALS
HEIGHT: 69 IN | DIASTOLIC BLOOD PRESSURE: 68 MMHG | SYSTOLIC BLOOD PRESSURE: 107 MMHG | TEMPERATURE: 98 F | HEART RATE: 82 BPM | WEIGHT: 223 LBS | BODY MASS INDEX: 33.03 KG/M2

## 2023-03-10 DIAGNOSIS — M54.16 LUMBAR RADICULITIS: ICD-10-CM

## 2023-03-10 DIAGNOSIS — G89.29 CHRONIC BACK PAIN GREATER THAN 3 MONTHS DURATION: ICD-10-CM

## 2023-03-10 DIAGNOSIS — M47.816 LUMBAR SPONDYLOSIS: Primary | ICD-10-CM

## 2023-03-10 DIAGNOSIS — M51.36 LUMBAR DEGENERATIVE DISC DISEASE: ICD-10-CM

## 2023-03-10 DIAGNOSIS — M54.9 CHRONIC BACK PAIN GREATER THAN 3 MONTHS DURATION: ICD-10-CM

## 2023-03-10 PROCEDURE — 99213 PR OFFICE/OUTPT VISIT, EST, LEVL III, 20-29 MIN: ICD-10-PCS | Mod: ,,, | Performed by: ANESTHESIOLOGY

## 2023-03-10 PROCEDURE — 99213 OFFICE O/P EST LOW 20 MIN: CPT | Mod: ,,, | Performed by: ANESTHESIOLOGY

## 2023-03-10 NOTE — PROGRESS NOTES
Pauline Lala MD        PATIENT NAME: Wen Morris  : 1955  DATE: 3/10/23  MRN: [id number]      Billing Provider: Pauline Lala MD  Level of Service:   Patient PCP Information       Provider PCP Type    Naya Whittaker MD General            Reason for Visit / Chief Complaint: Back Pain (4 month f/u back pain, pt states he has been taking hemp gummies which gives him great relief, pain level 3/10)       Update PCP  Update Chief Complaint         History of Present Illness / Problem Focused Workflow     Wen Morris presents to the clinic with Back Pain (4 month f/u back pain, pt states he has been taking hemp gummies which gives him great relief, pain level 3/10)       This is a 67-year-old male who returns to clinic today for follow-up of his chronic low back pain.  He has been trying to avoid epidural steroid injections because they caused his glucose to elevated it was difficult to control.  He is taking Lyrica, Robaxin, and naproxen [facility].  He was doing physical therapy but states that he ran out of visits.  Since he was last seen here, he started taking CBD gummies and finds that they have been rather helpful.  He currently rates his pain as 3/10 on the NRS; before taking the gummies, his pain would average 7 to 8/10.          Back Pain    Follow-up      Review of Systems     Review of Systems   Musculoskeletal:  Positive for back pain.   All other systems reviewed and are negative.     Medical / Social / Family History     Past Medical History:   Diagnosis Date    '     Bell palsy     Chronic neck pain     Headache     Neuralgia        Past Surgical History:   Procedure Laterality Date    CHOLECYSTECTOMY         Social History  Mr. Morris  reports that he quit smoking about 7 years ago. His smoking use included cigarettes. He has never used smokeless tobacco. He reports current alcohol use of about 3.0 - 4.0 standard drinks per week. He reports that he does not use  drugs.    Family History  's Arturo family history includes Cancer in his father; Diabetes in his mother.    Medications and Allergies     Medications  Outpatient Medications Marked as Taking for the 3/10/23 encounter (Office Visit) with Pauline Lala MD   Medication Sig Dispense Refill    ALPHA LIPOIC ACID ORAL Take by mouth.      ascorbic acid, vitamin C, (VITAMIN C) 1000 MG tablet Take 1,000 mg by mouth once daily.      aspirin 325 MG tablet Take 325 mg by mouth once daily.      capsicum 0.075% (ZOSTRIX) 0.075 % topical cream Apply 1 application topically 2 (two) times a day. Apply with gloves      carvediloL (COREG) 25 MG tablet Take 12.5 mg by mouth 2 (two) times a day.      chlorpheniramine (CHLOR-TRIMETON) 4 mg tablet Take 4 mg by mouth every 6 (six) hours as needed for Allergies.      cyclobenzaprine (FLEXERIL) 10 MG tablet Take 10 mg by mouth 3 (three) times daily as needed for Muscle spasms.      donepeziL (ARICEPT) 10 MG tablet Take 10 mg by mouth every evening.      empagliflozin (JARDIANCE) 25 mg tablet Take 25 mg by mouth once daily.      entecavir (BARACLUDE) 0.5 MG Tab Take 0.5 mg by mouth once daily.      FLUoxetine 20 MG capsule Take 20 mg by mouth once daily.      glipiZIDE (GLUCOTROL) 10 MG tablet Take 10 mg by mouth 2 (two) times daily before meals.      latanoprost 0.005 % ophthalmic solution Place 1 drop into both eyes every evening.      lisinopriL 10 MG tablet Take 10 mg by mouth 2 (two) times a day.      memantine (NAMENDA) 10 MG Tab Take 5 mg by mouth 2 (two) times daily.      methocarbamoL (ROBAXIN) 750 MG Tab Take 750 mg by mouth every 8 (eight) hours as needed.      multivit-min/ferrous fumarate (MULTI VITAMIN ORAL) Take 1 tablet by mouth Daily.      naproxen (NAPROSYN) 500 MG tablet Take 500 mg by mouth 2 (two) times daily with meals.      omeprazole (PRILOSEC) 20 MG capsule Take 20 mg by mouth once daily.      pregabalin (LYRICA) 100 MG capsule Take 100 mg  by mouth 2 (two) times a day.      rimegepant 75 mg odt Take 1 tablet (75 mg total) by mouth every other day. Place ODT tablet on the tongue; alternatively the ODT tablet may be placed under the tongue 15 tablet 12    rosuvastatin (CRESTOR) 10 MG tablet Take 10 mg by mouth once daily.      tenofovir disoproxil fumarate (VIREAD) 300 mg Tab Take 300 mg by mouth once daily.      zolpidem (AMBIEN) 10 mg Tab Take 5 mg by mouth nightly as needed.         Allergies  Review of patient's allergies indicates:   Allergen Reactions    Benadryl allergy decongestant     Hydrocodone-acetaminophen     Codeine Anxiety     Other reaction(s): Hyperactive behavior    Corticosteroids (glucocorticoids) Anxiety    Diphenhydramine hcl Anxiety     Other reaction(s): Hyperactive behavior       Physical Examination     Vitals:    03/10/23 0916   BP: 107/68   Pulse: 82   Temp: 97.8 °F (36.6 °C)       Spine Musculoskeletal Exam    Inspection    Leg length disparity: no discrepancy    Thoracolumbar    Erythema: none    Edema (right lower extremity): none    Edema (left lower extremity): none    Ecchymosis: none    Deformity: none    General      Constitutional: appears stated age, well-developed and well-nourished    Scleral icterus: no    Labored breathing: no    Psychiatric: normal mood and affect and no acute distress    Neurological: alert and oriented x3    Skin: intact     Assessment and Plan (including Health Maintenance)      Problem List  Smart Sets  Document Outside HM   :    Plan:   Lumbar spondylosis    Lumbar radiculitis    Lumbar degenerative disc disease    Chronic back pain greater than 3 months duration     He is doing pretty well at this time and will continue taking Lyrica, naproxen, and Robaxin.  He has started taking CBD gummies, which have been rather helpful.  He does not need to undergo any injections at this time.  I will follow up with him again in about 4 months or sooner if needed.    Problem List Items  Addressed This Visit       Chronic back pain greater than 3 months duration    Lumbar spondylosis - Primary    Lumbar radiculitis    Lumbar degenerative disc disease         Future Appointments   Date Time Provider Department Center   6/1/2023 10:30 AM Coco Zuniga V, AGACNP[facility] [address] [facility]        There are no Patient Instructions on file for this visit.  No follow-ups on file.     Signature:  Pauline Lala MD      Date of encounter: 3/10/23

## 2023-03-10 NOTE — LETTER
March 10, 2023       [facility]  [address], [address]  [place] [address]  Phone: [phone]  Fax: [phone]       Patient: Wen Morris   YOB: 1955  Date of Visit: 03/10/2023    To Whom It May Concern:    Ariela Morris  was at [facility] on 03/10/2023. If you have any questions or concerns, or if I can be of further assistance, please do not hesitate to contact me.    Sincerely,  Pauline Lala MD

## 2023-03-13 ENCOUNTER — TELEPHONE (OUTPATIENT)
Dept: NEUROLOGY | Facility: CLINIC | Age: 68
End: 2023-03-13
Payer: OTHER GOVERNMENT

## 2023-03-13 NOTE — TELEPHONE ENCOUNTER
[facility] they will be faxing paperwork for his medication to clinic.   States he unsure what medication this is about, but it is for his migraines.     Medication:       Pharmacy: [facility]/ [place]    LOV: 3/2/23    NOV: 6/1/23

## 2023-03-23 ENCOUNTER — TELEPHONE (OUTPATIENT)
Dept: NEUROLOGY | Facility: CLINIC | Age: 68
End: 2023-03-23
Payer: OTHER GOVERNMENT

## 2023-03-23 NOTE — TELEPHONE ENCOUNTER
Patient called reporting he has been experiencing migraines x the last 6 months. States the migraines have gotten worst and was waiting for the paperwork for his Qulipta 30 mg daily prescription be filled and faxed. States he is having nausea but denies any vomiting. States the migraines are located in the back of his head to the top of his temple area. Denies any vision blurriness or aura. States he just went see his Ophthalmologist and was prescribed transition glasses to help with vision, sun light, and car lights. States he have sensitivity to noise but denies any to smell or light. Pain level 8/10. Patient admitted to take Aspirin 325 mg QID and BC powder, states none of these medications gave him any relief. Requesting a call back to further discuss his Qulipta Rx and options while he waits for his medication. Please advise.

## 2023-03-23 NOTE — TELEPHONE ENCOUNTER
Phyllis Merino LPN  Licensed Nurse  Telephone Encounter  Signed  Encounter Date:  3/8/2023               Rtn call - Lmsg explaining Quilpta was denied by [facility] QOD oral was sent in for approval  If Nurtec is denied, will try for one monthly injectable Aimovig.   Suggest that he f/u w Dr fidelia [facility] re medication approvals.          Electronically signed by Phyllis eMrino LPN at 3/9/2023  9:43 AM

## 2023-03-27 PROBLEM — G43.909 EPISODIC MIGRAINE: Status: ACTIVE | Noted: 2023-03-27

## 2023-03-27 PROBLEM — G43.909 EPISODIC MIGRAINE: Chronic | Status: ACTIVE | Noted: 2023-03-27

## 2023-03-29 ENCOUNTER — TELEPHONE (OUTPATIENT)
Dept: NEUROLOGY | Facility: CLINIC | Age: 68
End: 2023-03-29
Payer: OTHER GOVERNMENT

## 2023-06-01 ENCOUNTER — OFFICE VISIT (OUTPATIENT)
Dept: NEUROLOGY | Facility: CLINIC | Age: 68
End: 2023-06-01
Payer: OTHER GOVERNMENT

## 2023-06-01 VITALS
BODY MASS INDEX: 33.03 KG/M2 | HEIGHT: 69 IN | WEIGHT: 223 LBS | DIASTOLIC BLOOD PRESSURE: 64 MMHG | SYSTOLIC BLOOD PRESSURE: 124 MMHG

## 2023-06-01 DIAGNOSIS — G43.709 CHRONIC MIGRAINE WITHOUT AURA WITHOUT STATUS MIGRAINOSUS, NOT INTRACTABLE: Chronic | ICD-10-CM

## 2023-06-01 PROCEDURE — 99213 OFFICE O/P EST LOW 20 MIN: CPT | Mod: PBBFAC | Performed by: NURSE PRACTITIONER

## 2023-06-01 PROCEDURE — 99214 OFFICE O/P EST MOD 30 MIN: CPT | Mod: S$PBB,,, | Performed by: NURSE PRACTITIONER

## 2023-06-01 PROCEDURE — 99999 PR PBB SHADOW E&M-EST. PATIENT-LVL III: CPT | Mod: PBBFAC,,, | Performed by: NURSE PRACTITIONER

## 2023-06-01 PROCEDURE — 99214 PR OFFICE/OUTPT VISIT, EST, LEVL IV, 30-39 MIN: ICD-10-PCS | Mod: S$PBB,,, | Performed by: NURSE PRACTITIONER

## 2023-06-01 PROCEDURE — 99999 PR PBB SHADOW E&M-EST. PATIENT-LVL III: ICD-10-PCS | Mod: PBBFAC,,, | Performed by: NURSE PRACTITIONER

## 2023-06-01 RX ORDER — NAPROXEN 250 MG/1
500 TABLET ORAL 2 TIMES DAILY
COMMUNITY
End: 2023-08-18

## 2023-06-01 RX ORDER — SUMATRIPTAN AND NAPROXEN SODIUM 85; 500 MG/1; MG/1
1 TABLET, FILM COATED ORAL ONCE AS NEEDED
COMMUNITY
End: 2023-11-20

## 2023-06-01 NOTE — PROGRESS NOTES
Subjective:       Patient ID: Wen Morris is a 68 y.o. male.    Chief Complaint: chronic migraine & occipital neuralgia    HPI  On coreg 12.5mg bid, flexeril 10mg tid prn, fluoxetine, robaxin prn, lyrica 100mg bid, nurtec qod    Prev tried: gabapentin  ONB have prev not provided relief    Tried steroids - didn't work, plus he's diabetic  H/o CAD & reported cva - triptans contraindicated    He is feeling great since starting Nurtec!  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    Review of Systems   A 14pt ROS was reviewed & is negative unless otherwise documented in the HPI  Objective:      Physical Exam    GENERAL: NAD, calm, cooperative, appropriate, smiling today  Awake/alert  Well groomed  RESP: CTAB  HEART: RRR  No LE edema  MENTAL STATUS: oriented, follow commands reliably  SPEECH/LANGUAGE: clear, fluent  CN:  Perrla, eomi, vff, gaze conjugate  No tactile or motor facial asymmetry  Tongue protrudes midline  Motor: no focal weakness  Cerebellar: no tremor or dysmetria  Sensory: normal to tactile stim/vibration  DTRs: normal +2, symmetric  Gait: steady    Problem List Items Addressed This Visit          Neuro    Chronic migraine without aura without status migrainosus, not intractable (Chronic)         PLAN:  Cont nurtec 75mg qod as is working well for him  F/u 6mo

## 2023-06-01 NOTE — PATIENT INSTRUCTIONS
"Migraines in Adults   The Basics   Written by the doctors and editors at [facility]   What are migraines? -- Migraines are a kind of headache that can also involve other symptoms. Migraines can affect both adults and children. They are more common in women than in men. Migraines often start off mild and then get worse.  What are the symptoms of migraines in adults? -- Symptoms can include:  Headache - The headache gets worse over several hours and is usually throbbing. It often affects 1 side of the head.  Nausea and sometimes vomiting  Feeling sensitive to light and noise - Lying down in a quiet, dark room often helps.  Aura - Some people have something called a migraine "aura." An aura is a symptom or feeling that happens before or during the migraine headache. Each person's aura is different, but in most cases the aura affects the vision. You might see flashing lights, bright spots, or zig-zag lines, or lose part of your vision. Or you might have numbness and tingling of the lips, lower face, and fingers of 1 hand. Some people hear sounds or have ringing in their ears as part of their aura. The aura usually lasts a few minutes to an hour and then goes away, but most often lasts 15 to 30 minutes.  Women who get migraines with aura usually cannot take birth control pills. That's because they might increase the risk of stroke.  Many people get other symptoms of migraine that happen several hours or even a day before the headache. Doctors call these "premonitory" or "prodromal" symptoms. They might include yawning, feeling depressed, irritability, food cravings, constipation, or a stiff neck.  Is there a test for migraines? -- No. There is no test. But your doctor should be able to tell if you have migraines by doing an exam and learning about your symptoms.  Should I see a doctor or nurse? -- Yes. If you think you are having migraines, you should talk to your doctor or nurse. You should also see a doctor or nurse if " "your migraines get worse or more frequent, or if you have new symptoms.  Is there anything I can do to prevent migraines? -- Yes. Some people find that their migraines are triggered by certain things. If you can avoid some of these things, you can lower your chances of getting migraines.  You can also keep a "headache calendar." In the calendar, write down every time you have a migraine and what you ate and did before it started. That way you can find out if there is anything you should avoid eating or doing. You can also write down what medicine you took and whether or not it helped.  Common migraine triggers include:  Stress  Hormonal changes  Skipping meals or not eating enough  Changes in the weather  Sleeping too much or too little  Bright or flashing lights  Drinking alcohol  Certain drinks or foods, such as red wine, aged cheese, and hot dogs  If your migraines are frequent or severe, your doctor can suggest others ways to help prevent them. For example, it might help to learn relaxation techniques and ways to manage stress. There are also medicines that can help.  Some women get migraines just before or during their period. Medicine can help with this, too.  How are migraines treated? -- There are many different medicines that can help with migraines. Your doctor can help you find the best treatment for your situation.  For mild migraines, your doctor might suggest an over-the-counter medicine such as acetaminophen (sample brand name: Tylenol), ibuprofen (sample brand names: Advil, Motrin), or naproxen (sample brand name: Aleve). There is also a medicine that combines acetaminophen, aspirin, and caffeine (sample brand name: Excedrin).  For more severe migraines, there are prescription medicines that can help. Some, such as medicines called triptans, help to relieve the pain from a migraine attack. Other prescription medicines can help to make migraine attacks happen less often. If you have severe nausea or " vomiting with your migraines, there are medicines that can help with that, too.   Do not try to treat frequent migraines on your own with non-prescription pain medicines. Taking non-prescription pain medicines too often can actually cause more headaches later.  What if I want to get pregnant? -- If you want to get pregnant, talk to your doctor or nurse about it before you start trying. Some medicines used to treat and prevent migraines are not safe during pregnancy, so you might need to switch medicines before you get pregnant.  Some women notice that their migraines actually get better during pregnancy and breastfeeding. This is related to hormonal changes in the body.  All topics are updated as new evidence becomes available and our peer review process is complete.  This topic retrieved from [employer] on: Sep 21, 2021.  Topic 818114 Version 5.0  Release: 29.4.2 - C29.263  © 2021 UpToDate, Inc. and/or its affiliates. All rights reserved.  Consumer Information Use and Disclaimer   This information is not specific medical advice and does not replace information you receive from your health care provider. This is only a brief summary of general information. It does NOT include all information about conditions, illnesses, injuries, tests, procedures, treatments, therapies, discharge instructions or life-style choices that may apply to you. You must talk with your health care provider for complete information about your health and treatment options. This information should not be used to decide whether or not to accept your health care provider's advice, instructions or recommendations. Only your health care provider has the knowledge and training to provide advice that is right for you. The use of this information is governed by the [employer] End User License Agreement, available at https://www.Taskhub.[employer]/en/solutions/Zinc software/about/eliane.The use of [employer] content is governed by the [employer] Terms of Use. ©2021  [employer]. All rights reserved.  Copyright   © 2021 [employer] Inc. and/or its affiliates. All rights reserved.

## 2023-07-10 ENCOUNTER — OFFICE VISIT (OUTPATIENT)
Dept: PAIN MEDICINE | Facility: CLINIC | Age: 68
End: 2023-07-10
Payer: OTHER GOVERNMENT

## 2023-07-10 VITALS
BODY MASS INDEX: 31.99 KG/M2 | HEART RATE: 63 BPM | HEIGHT: 69 IN | DIASTOLIC BLOOD PRESSURE: 68 MMHG | SYSTOLIC BLOOD PRESSURE: 116 MMHG | WEIGHT: 216 LBS

## 2023-07-10 DIAGNOSIS — G89.29 CHRONIC BACK PAIN GREATER THAN 3 MONTHS DURATION: ICD-10-CM

## 2023-07-10 DIAGNOSIS — M47.816 LUMBAR SPONDYLOSIS: ICD-10-CM

## 2023-07-10 DIAGNOSIS — M51.36 LUMBAR DEGENERATIVE DISC DISEASE: ICD-10-CM

## 2023-07-10 DIAGNOSIS — M54.9 CHRONIC BACK PAIN GREATER THAN 3 MONTHS DURATION: ICD-10-CM

## 2023-07-10 DIAGNOSIS — M51.36 DEGENERATION, INTERVERTEBRAL DISC, LUMBAR: Primary | ICD-10-CM

## 2023-07-10 PROCEDURE — 96372 THER/PROPH/DIAG INJ SC/IM: CPT | Mod: ,,, | Performed by: ANESTHESIOLOGY

## 2023-07-10 PROCEDURE — 96372 PR INJECTION,THERAP/PROPH/DIAG2ST, IM OR SUBCUT: ICD-10-PCS | Mod: ,,, | Performed by: ANESTHESIOLOGY

## 2023-07-10 PROCEDURE — 99214 OFFICE O/P EST MOD 30 MIN: CPT | Mod: 25,,, | Performed by: ANESTHESIOLOGY

## 2023-07-10 PROCEDURE — 99214 PR OFFICE/OUTPT VISIT, EST, LEVL IV, 30-39 MIN: ICD-10-PCS | Mod: 25,,, | Performed by: ANESTHESIOLOGY

## 2023-07-10 RX ORDER — KETOROLAC TROMETHAMINE 30 MG/ML
30 INJECTION, SOLUTION INTRAMUSCULAR; INTRAVENOUS ONCE
Status: DISCONTINUED | OUTPATIENT
Start: 2023-07-10 | End: 2023-07-10

## 2023-07-10 RX ORDER — KETOROLAC TROMETHAMINE 30 MG/ML
30 INJECTION, SOLUTION INTRAMUSCULAR; INTRAVENOUS
Status: DISCONTINUED | OUTPATIENT
Start: 2023-07-10 | End: 2023-07-10

## 2023-07-10 RX ORDER — KETOROLAC TROMETHAMINE 30 MG/ML
30 INJECTION, SOLUTION INTRAMUSCULAR; INTRAVENOUS
Status: COMPLETED | OUTPATIENT
Start: 2023-07-10 | End: 2023-07-10

## 2023-07-10 RX ORDER — PREGABALIN 100 MG/1
100 CAPSULE ORAL 2 TIMES DAILY
Qty: 60 CAPSULE | Refills: 3 | Status: SHIPPED | OUTPATIENT
Start: 2023-07-10

## 2023-07-10 RX ADMIN — KETOROLAC TROMETHAMINE 30 MG: 30 INJECTION, SOLUTION INTRAMUSCULAR; INTRAVENOUS at 01:07

## 2023-07-10 NOTE — PROGRESS NOTES
Pauline Lala MD        PATIENT NAME: Wen Morris  : 1955  DATE: 7/10/23  MRN: [id number]      Billing Provider: Pauline Lala MD  Level of Service:   Patient PCP Information       Provider PCP Type    Naya Whittaker MD General            Reason for Visit / Chief Complaint: Back Pain (Pt states his back is worse from last visit, no injury, pt states he wears a back brace that helps and using a arthritis cream. Wanting to discuss tx options. )       Update PCP  Update Chief Complaint         History of Present Illness / Problem Focused Workflow     Wen Morris presents to the clinic with Back Pain (Pt states his back is worse from last visit, no injury, pt states he wears a back brace that helps and using a arthritis cream. Wanting to discuss tx options. )       This is a 68-year-old male who returns to clinic today for follow-up of his chronic low back pain.  He has been trying to avoid epidural steroid injections because they caused his glucose to elevated it was difficult to control.  He [facility] Lyrica[facility] Robaxin, [facility]  He was doing physical therapy but states that he ran out of visits.  He has been taking CBD gummies, but [facility] not [facility] them [facility] so he has to pay for them out-of-pocket and it is getting expensive.  Complains that his lower back pain is getting worse but he denies radiation down the lower extremities.  He states that [facility], but the results are not available to me at this time.              Back Pain    Follow-up      Review of Systems     Review of Systems   Musculoskeletal:  Positive for back pain.   All other systems reviewed and are negative.     Medical / Social / Family History     Past Medical History:   Diagnosis Date    '     Bell palsy     Chronic neck pain     Headache     Neuralgia        Past Surgical History:   Procedure Laterality Date    CHOLECYSTECTOMY         Social  History  Mr. Morris  reports that he quit smoking about 7 years ago. His smoking use included cigarettes. He has never used smokeless tobacco. He reports current alcohol use of about 3.0 - 4.0 standard drinks per week. He reports that he does not use drugs.    Family History  's Arturo family history includes Cancer in his father; Diabetes in his mother.    Medications and Allergies     Medications  Outpatient Medications Marked as Taking for the 7/10/23 encounter (Office Visit) with Pauline Lala MD   Medication Sig Dispense Refill    ALPHA LIPOIC ACID ORAL Take by mouth.      ascorbic acid, vitamin C, (VITAMIN C) 1000 MG tablet Take 1,000 mg by mouth once daily.      aspirin 325 MG tablet Take 81 mg by mouth once daily.      aspirin 81 mg Cap Take 81 mg by mouth.      capsicum 0.075% (ZOSTRIX) 0.075 % topical cream Apply 1 application topically 2 (two) times a day. Apply with gloves      carvediloL (COREG) 25 MG tablet Take 12.5 mg by mouth 2 (two) times a day.      chlorpheniramine (CHLOR-TRIMETON) 4 mg tablet Take 4 mg by mouth every 6 (six) hours as needed for Allergies.      cyclobenzaprine (FLEXERIL) 10 MG tablet Take 10 mg by mouth 3 (three) times daily as needed for Muscle spasms.      donepeziL (ARICEPT) 10 MG tablet Take 10 mg by mouth every evening.      empagliflozin (JARDIANCE) 25 mg tablet Take 25 mg by mouth once daily.      entecavir (BARACLUDE) 0.5 MG Tab Take 0.5 mg by mouth once daily.      FLUoxetine 20 MG capsule Take 20 mg by mouth once daily.      glipiZIDE (GLUCOTROL) 10 MG tablet Take 10 mg by mouth 2 (two) times daily before meals.      latanoprost 0.005 % ophthalmic solution Place 1 drop into both eyes every evening.      lisinopriL 10 MG tablet Take 10 mg by mouth 2 (two) times a day.      memantine (NAMENDA) 10 MG Tab Take 5 mg by mouth 2 (two) times daily.      methocarbamoL (ROBAXIN) 750 MG Tab Take 750 mg by mouth every 8 (eight) hours as needed.      multivit-min/ferrous  fumarate (MULTI VITAMIN ORAL) Take 1 tablet by mouth Daily.      naproxen (NAPROSYN) 250 MG tablet Take 500 mg by mouth 2 (two) times a day.      naproxen (NAPROSYN) 500 MG tablet Take 500 mg by mouth 2 (two) times daily with meals.      omeprazole (PRILOSEC) 20 MG capsule Take 20 mg by mouth once daily.      pregabalin (LYRICA) 100 MG capsule Take 100 mg by mouth 2 (two) times a day.      rimegepant 75 mg odt Take 1 tablet (75 mg total) by mouth every other day. Place ODT tablet on the tongue; alternatively the ODT tablet may be placed under the tongue 15 tablet 12    rosuvastatin (CRESTOR) 10 MG tablet Take 10 mg by mouth once daily.      SUMAtriptan-naproxen (TREXIMET)  mg Tab Take 1 tablet by mouth once as needed.      tenofovir disoproxil fumarate (VIREAD) 300 mg Tab Take 300 mg by mouth once daily.      zolpidem (AMBIEN) 10 mg Tab Take 5 mg by mouth nightly as needed.         Allergies  Review of patient's allergies indicates:   Allergen Reactions    Benadryl allergy decongestant     Hydrocodone-acetaminophen     Codeine Anxiety     Other reaction(s): Hyperactive behavior    Corticosteroids (glucocorticoids) Anxiety    Diphenhydramine hcl Anxiety     Other reaction(s): Hyperactive behavior       Physical Examination     Vitals:    07/10/23 0851   BP: 116/68   Pulse: 63       Spine Musculoskeletal Exam    Inspection    Leg length disparity: no discrepancy    Thoracolumbar    Erythema: none    Edema (right lower extremity): none    Edema (left lower extremity): none    Ecchymosis: none    Deformity: none    General      Constitutional: appears stated age, well-developed and well-nourished    Scleral icterus: no    Labored breathing: no    Psychiatric: normal mood and affect and no acute distress    Neurological: alert and oriented x3    Skin: intact     Assessment and Plan (including Health Maintenance)      Problem List  Smart Sets  Document Outside HM   :    Plan:   Degeneration, intervertebral disc,  lumbar    Lumbar spondylosis    Lumbar degenerative disc disease    Chronic back pain greater than 3 months duration       He will continue taking Lyrica, naproxen, and Robaxin as previously prescribed.  I am sending in a refill of Lyrica today but he states he does not need refills of naproxen and Robaxin at this time.  He is receiving a Toradol 30 mg intramuscular injection in the clinic as well.  I am [facility] the [facility]  I will see him back in 3-4 months or sooner if needed.    Problem List Items Addressed This Visit       Degeneration, intervertebral disc, lumbar - Primary    Chronic back pain greater than 3 months duration    Lumbar spondylosis    Lumbar degenerative disc disease         Future Appointments   Date Time Provider Department Center   7/10/2023  9:15 AM Pauline Lala MD [facility] [place]   11/20/2023 11:00 AM Coco Zuniga V, AGACNP[facility] [address] [place]        There are no Patient Instructions on file for this visit.  No follow-ups on file.     Signature:  Pauline Lala MD      Date of encounter: 7/10/23

## 2023-07-10 NOTE — LETTER
July 10, 2023       [facility]  [address], [address]  [facility] [address]  Phone: [phone]  Fax: [phone]       Patient: Wen Morris   YOB: 1955  Date of Visit: 07/10/2023    To Whom It May Concern:    Ariela Morris  was at [facility] on 07/10/2023. The patient may return to work/school on 7/11/2023 with no restrictions. If you have any questions or concerns, or if I can be of further assistance, please do not hesitate to contact me.    Sincerely,    Syd Quevedo MA

## 2023-08-03 DIAGNOSIS — M47.896 OTHER OSTEOARTHRITIS OF SPINE, LUMBAR REGION: Primary | ICD-10-CM

## 2023-08-18 ENCOUNTER — HOSPITAL ENCOUNTER (OUTPATIENT)
Dept: RADIOLOGY | Facility: HOSPITAL | Age: 68
Discharge: HOME | End: 2023-08-18
Attending: NEUROLOGICAL SURGERY
Payer: OTHER GOVERNMENT

## 2023-08-18 VITALS
SYSTOLIC BLOOD PRESSURE: 152 MMHG | HEART RATE: 68 BPM | HEIGHT: 69 IN | DIASTOLIC BLOOD PRESSURE: 72 MMHG | BODY MASS INDEX: 32.07 KG/M2 | OXYGEN SATURATION: 97 % | WEIGHT: 216.5 LBS | TEMPERATURE: 98 F

## 2023-08-18 DIAGNOSIS — M47.896 OTHER OSTEOARTHRITIS OF SPINE, LUMBAR REGION: ICD-10-CM

## 2023-08-18 LAB
POC PTINR: 1.2 (ref 0.9–1.2)
POC PTWBT: 14 SEC (ref 9.7–14.3)
SAMPLE: NORMAL

## 2023-08-18 PROCEDURE — 25000003 PHARM REV CODE 250: Performed by: RADIOLOGY

## 2023-08-18 PROCEDURE — 72132 CT LUMBAR SPINE W/DYE: CPT | Mod: TC

## 2023-08-18 PROCEDURE — 25500020 PHARM REV CODE 255: Performed by: NEUROLOGICAL SURGERY

## 2023-08-18 RX ORDER — SODIUM CHLORIDE 0.9 % (FLUSH) 0.9 %
10 SYRINGE (ML) INJECTION
Status: DISCONTINUED | OUTPATIENT
Start: 2023-08-18 | End: 2023-08-20 | Stop reason: HOSPADM

## 2023-08-18 RX ORDER — DIAZEPAM 5 MG/1
10 TABLET ORAL
Status: COMPLETED | OUTPATIENT
Start: 2023-08-18 | End: 2023-08-18

## 2023-08-18 RX ORDER — LIDOCAINE AND PRILOCAINE 25; 25 MG/G; MG/G
CREAM TOPICAL
Status: COMPLETED | OUTPATIENT
Start: 2023-08-18 | End: 2023-08-18

## 2023-08-18 RX ORDER — IOPAMIDOL 408 MG/ML
15 INJECTION, SOLUTION INTRATHECAL
Status: COMPLETED | OUTPATIENT
Start: 2023-08-18 | End: 2023-08-18

## 2023-08-18 RX ADMIN — LIDOCAINE AND PRILOCAINE: 25; 25 CREAM TOPICAL at 09:08

## 2023-08-18 RX ADMIN — DIAZEPAM 10 MG: 5 TABLET ORAL at 09:08

## 2023-08-18 RX ADMIN — IOPAMIDOL 15 ML: 408 INJECTION, SOLUTION INTRATHECAL at 11:08

## 2023-11-10 ENCOUNTER — OFFICE VISIT (OUTPATIENT)
Dept: PAIN MEDICINE | Facility: CLINIC | Age: 68
End: 2023-11-10
Payer: OTHER GOVERNMENT

## 2023-11-10 VITALS — SYSTOLIC BLOOD PRESSURE: 134 MMHG | HEART RATE: 74 BPM | DIASTOLIC BLOOD PRESSURE: 72 MMHG

## 2023-11-10 DIAGNOSIS — M51.36 LUMBAR DEGENERATIVE DISC DISEASE: ICD-10-CM

## 2023-11-10 DIAGNOSIS — M51.36 DEGENERATION, INTERVERTEBRAL DISC, LUMBAR: ICD-10-CM

## 2023-11-10 DIAGNOSIS — M54.9 CHRONIC BACK PAIN GREATER THAN 3 MONTHS DURATION: ICD-10-CM

## 2023-11-10 DIAGNOSIS — G89.29 CHRONIC BACK PAIN GREATER THAN 3 MONTHS DURATION: ICD-10-CM

## 2023-11-10 DIAGNOSIS — M47.816 LUMBAR SPONDYLOSIS: Primary | ICD-10-CM

## 2023-11-10 PROCEDURE — 99214 OFFICE O/P EST MOD 30 MIN: CPT | Mod: ,,, | Performed by: ANESTHESIOLOGY

## 2023-11-10 PROCEDURE — 99214 PR OFFICE/OUTPT VISIT, EST, LEVL IV, 30-39 MIN: ICD-10-PCS | Mod: ,,, | Performed by: ANESTHESIOLOGY

## 2023-11-10 NOTE — PROGRESS NOTES
Pauline Lala MD        PATIENT NAME: Wen Morris  : 1955  DATE: 11/10/23  MRN: [id number]      Billing Provider: Pauline Lala MD  Level of Service:   Patient PCP Information       Provider PCP Type    Naya Whittaker MD General            Reason for Visit / Chief Complaint: Back Pain (Patient states his back hurts on going for a while now. Pain scale 8. Limits mobility and wears back brace sometimes to help.)       Update PCP  Update Chief Complaint         History of Present Illness / Problem Focused Workflow     Wen Morris presents to the clinic with Back Pain (Patient states his back hurts on going for a while now. Pain scale 8. Limits mobility and wears back brace sometimes to help.)       This is a 68-year-old male who returns to clinic today for follow-up of his chronic low back pain.  He has been trying to avoid epidural steroid injections because they caused his glucose to elevated it was difficult to control.  He is taking Lyrica, Robaxin, and [facility]rox[facility].  He was doing physical therapy but states that he ran out of visits.   Complains that his lower back pain is getting worse but he denies radiation down the lower extremities.  He is referred to a [facility] in [place] [facility], but he was unable to make the appointment because his  got injured.  [facility] discontinue his naproxen and started him on a new medication but he does not recall the name of it.  He is currently on antibiotics for an infection in his left axilla.  His surgeon recommended trying lumbar medial branch blocks and radiofrequency ablation if indicated.      Back Pain    Follow-up        Review of Systems     Review of Systems   Musculoskeletal:  Positive for back pain.   All other systems reviewed and are negative.     Medical / Social / Family History     Past Medical History:   Diagnosis Date    '     Bell palsy     Chronic neck pain     Headache      Neuralgia     Unspecified viral hepatitis B without hepatic coma        Past Surgical History:   Procedure Laterality Date    CHOLECYSTECTOMY         Social History  Mr. Morris  reports that he quit smoking about 7 years ago. His smoking use included cigarettes. He has never used smokeless tobacco. He reports current alcohol use of about 3.0 - 4.0 standard drinks of alcohol per week. He reports that he does not use drugs.    Family History  's Arturo family history includes Cancer in his father; Diabetes in his mother.    Medications and Allergies     Medications  Outpatient Medications Marked as Taking for the 11/10/23 encounter (Office Visit) with Pauline Lala MD   Medication Sig Dispense Refill    ALPHA LIPOIC ACID ORAL Take by mouth.      ascorbic acid, vitamin C, (VITAMIN C) 1000 MG tablet Take 1,000 mg by mouth once daily.      aspirin 81 mg Cap Take 81 mg by mouth.      capsicum 0.075% (ZOSTRIX) 0.075 % topical cream Apply 1 application topically 2 (two) times a day. Apply with gloves      carvediloL (COREG) 25 MG tablet Take 12.5 mg by mouth 2 (two) times a day.      chlorpheniramine (CHLOR-TRIMETON) 4 mg tablet Take 4 mg by mouth every 6 (six) hours as needed for Allergies.      cyclobenzaprine (FLEXERIL) 10 MG tablet Take 10 mg by mouth 3 (three) times daily as needed for Muscle spasms.      donepeziL (ARICEPT) 10 MG tablet Take 10 mg by mouth every evening.      empagliflozin (JARDIANCE) 25 mg tablet Take 25 mg by mouth once daily.      entecavir (BARACLUDE) 0.5 MG Tab Take 0.5 mg by mouth once daily.      FLUoxetine 20 MG capsule Take 20 mg by mouth once daily.      glipiZIDE (GLUCOTROL) 10 MG tablet Take 10 mg by mouth 2 (two) times daily before meals.      latanoprost 0.005 % ophthalmic solution Place 1 drop into both eyes every evening.      lisinopriL 10 MG tablet Take 10 mg by mouth 2 (two) times a day.      memantine (NAMENDA) 10 MG Tab Take 5 mg by mouth 2 (two) times daily.       methocarbamoL (ROBAXIN) 750 MG Tab Take 750 mg by mouth every 8 (eight) hours as needed.      multivit-min/ferrous fumarate (MULTI VITAMIN ORAL) Take 1 tablet by mouth Daily.      omeprazole (PRILOSEC) 20 MG capsule Take 20 mg by mouth once daily.      pregabalin (LYRICA) 100 MG capsule Take 1 capsule (100 mg total) by mouth 2 (two) times a day. 60 capsule 3    rimegepant 75 mg odt Take 1 tablet (75 mg total) by mouth every other day. Place ODT tablet on the tongue; alternatively the ODT tablet may be placed under the tongue 15 tablet 12    rosuvastatin (CRESTOR) 10 MG tablet Take 10 mg by mouth once daily.      SUMAtriptan-naproxen (TREXIMET)  mg Tab Take 1 tablet by mouth once as needed.      tenofovir disoproxil fumarate (VIREAD) 300 mg Tab Take 300 mg by mouth once daily.      zolpidem (AMBIEN) 10 mg Tab Take 5 mg by mouth nightly as needed.         Allergies  Review of patient's allergies indicates:   Allergen Reactions    Benadryl allergy decongestant      hyperactivity    Hydrocodone-acetaminophen      hyperactivity    Codeine Anxiety     Other reaction(s): Hyperactive behavior    Corticosteroids (glucocorticoids) Anxiety     hyperactivity    Diphenhydramine hcl Anxiety     Other reaction(s): Hyperactive behavior       Physical Examination     Vitals:    11/10/23 0903   BP: 134/72   Pulse: 74             Spine Musculoskeletal Exam    Inspection    Leg length disparity: no discrepancy    Thoracolumbar    Erythema: none    Edema (right lower extremity): none    Edema (left lower extremity): none    Ecchymosis: none    Deformity: none    General      Constitutional: appears stated age, well-developed and well-nourished    Scleral icterus: no    Labored breathing: no    Psychiatric: normal mood and affect and no acute distress    Neurological: alert and oriented x3    Skin: intact   CV: extremities warm, well-perfused  Resp: nonlabored breathing    Assessment and Plan (including Health Maintenance)       Problem List  Smart Sets  Document Outside HM   :    Plan:   Lumbar spondylosis    Lumbar degenerative disc disease    Chronic back pain greater than 3 months duration    Degeneration, intervertebral disc, lumbar       He will continue taking Lyrica and Robaxin as previously prescribed.  I am scheduling him for bilateral L3-5 diagnostic medial branch blocks today.  We discussed that if he has good but temporary relief from 2 sets of blocks, we could then proceed to radiofrequency ablation.  He voices understanding and is in agreement with the plan.    Problem List Items Addressed This Visit       Degeneration, intervertebral disc, lumbar    Chronic back pain greater than 3 months duration    Lumbar spondylosis - Primary    Lumbar degenerative disc disease         Future Appointments   Date Time Provider Department Center   11/20/2023 11:00 AM Coco Zuniga, AGACNP-[facility] [address] [facility]        There are no Patient Instructions on file for this visit.  No follow-ups on file.     Signature:  Pauline Lala MD      Date of encounter: 11/10/23

## 2023-11-19 NOTE — PROGRESS NOTES
Subjective:       Patient ID: Wen Morris is a 68 y.o. male.    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      Review of Systems   A 14pt ROS was reviewed & is negative unless otherwise documented in the HPI  Objective:      Physical Exam    GENERAL: NAD, calm, cooperative, appropriate, smiling today  Awake/alert  Well groomed  RESP: CTAB  HEART: RRR  No LE edema  MENTAL STATUS: oriented, follow commands reliably  SPEECH/LANGUAGE: clear, fluent  CN:  Perrla, eomi, vff, gaze conjugate  No tactile or motor facial asymmetry  Tongue protrudes midline  Motor: no focal weakness  Cerebellar: no tremor or dysmetria  Sensory: normal to tactile stim/vibration  DTRs: normal +2, symmetric  Gait: steady    Problem List Items Addressed This Visit          Neuro    Chronic migraine without aura without status migrainosus, not intractable - Primary (Chronic)       PLAN:  Cont nurtec 75mg qod  F/u 6mo    Coco Zuniga, AGAP-BC

## 2023-11-20 ENCOUNTER — OFFICE VISIT (OUTPATIENT)
Dept: NEUROLOGY | Facility: CLINIC | Age: 68
End: 2023-11-20
Payer: OTHER GOVERNMENT

## 2023-11-20 VITALS
WEIGHT: 216 LBS | DIASTOLIC BLOOD PRESSURE: 72 MMHG | HEIGHT: 68 IN | SYSTOLIC BLOOD PRESSURE: 130 MMHG | BODY MASS INDEX: 32.74 KG/M2

## 2023-11-20 DIAGNOSIS — G43.709 CHRONIC MIGRAINE WITHOUT AURA WITHOUT STATUS MIGRAINOSUS, NOT INTRACTABLE: Primary | ICD-10-CM

## 2023-11-20 PROCEDURE — 99999 PR PBB SHADOW E&M-EST. PATIENT-LVL V: ICD-10-PCS | Mod: PBBFAC,,, | Performed by: NURSE PRACTITIONER

## 2023-11-20 PROCEDURE — 99215 OFFICE O/P EST HI 40 MIN: CPT | Mod: PBBFAC | Performed by: NURSE PRACTITIONER

## 2023-11-20 PROCEDURE — 99214 OFFICE O/P EST MOD 30 MIN: CPT | Mod: S$PBB,,, | Performed by: NURSE PRACTITIONER

## 2023-11-20 PROCEDURE — 99214 PR OFFICE/OUTPT VISIT, EST, LEVL IV, 30-39 MIN: ICD-10-PCS | Mod: S$PBB,,, | Performed by: NURSE PRACTITIONER

## 2023-11-20 PROCEDURE — 99999 PR PBB SHADOW E&M-EST. PATIENT-LVL V: CPT | Mod: PBBFAC,,, | Performed by: NURSE PRACTITIONER

## 2023-11-20 RX ORDER — CHOLECALCIFEROL (VITAMIN D3) 125 MCG
1 CAPSULE ORAL DAILY
COMMUNITY
Start: 2023-11-03

## 2023-11-20 RX ORDER — PROPRANOLOL HYDROCHLORIDE 20 MG/1
10 TABLET ORAL DAILY
COMMUNITY
Start: 2023-06-02

## 2023-11-20 RX ORDER — FUROSEMIDE 20 MG/1
20 TABLET ORAL DAILY
COMMUNITY
Start: 2023-06-26

## 2023-11-20 NOTE — PATIENT INSTRUCTIONS
"Migraines in Adults   The Basics   Written by the doctors and editors at [facility]   What are migraines? -- Migraines are a kind of headache that can also involve other symptoms. Migraines can affect both adults and children. They are more common in women than in men. Migraines often start off mild and then get worse.  What are the symptoms of migraines in adults? -- Symptoms can include:  Headache - The headache gets worse over several hours and is usually throbbing. It often affects 1 side of the head.  Nausea and sometimes vomiting  Feeling sensitive to light and noise - Lying down in a quiet, dark room often helps.  Aura - Some people have something called a migraine "aura." An aura is a symptom or feeling that happens before or during the migraine headache. Each person's aura is different, but in most cases the aura affects the vision. You might see flashing lights, bright spots, or zig-zag lines, or lose part of your vision. Or you might have numbness and tingling of the lips, lower face, and fingers of 1 hand. Some people hear sounds or have ringing in their ears as part of their aura. The aura usually lasts a few minutes to an hour and then goes away, but most often lasts 15 to 30 minutes.  Women who get migraines with aura usually cannot take birth control pills. That's because they might increase the risk of stroke.  Many people get other symptoms of migraine that happen several hours or even a day before the headache. Doctors call these "premonitory" or "prodromal" symptoms. They might include yawning, feeling depressed, irritability, food cravings, constipation, or a stiff neck.  Is there a test for migraines? -- No. There is no test. But your doctor should be able to tell if you have migraines by doing an exam and learning about your symptoms.  Should I see a doctor or nurse? -- Yes. If you think you are having migraines, you should talk to your doctor or nurse. You should also see a doctor or nurse if " "your migraines get worse or more frequent, or if you have new symptoms.  Is there anything I can do to prevent migraines? -- Yes. Some people find that their migraines are triggered by certain things. If you can avoid some of these things, you can lower your chances of getting migraines.  You can also keep a "headache calendar." In the calendar, write down every time you have a migraine and what you ate and did before it started. That way you can find out if there is anything you should avoid eating or doing. You can also write down what medicine you took and whether or not it helped.  Common migraine triggers include:  Stress  Hormonal changes  Skipping meals or not eating enough  Changes in the weather  Sleeping too much or too little  Bright or flashing lights  Drinking alcohol  Certain drinks or foods, such as red wine, aged cheese, and hot dogs  If your migraines are frequent or severe, your doctor can suggest others ways to help prevent them. For example, it might help to learn relaxation techniques and ways to manage stress. There are also medicines that can help.  Some women get migraines just before or during their period. Medicine can help with this, too.  How are migraines treated? -- There are many different medicines that can help with migraines. Your doctor can help you find the best treatment for your situation.  For mild migraines, your doctor might suggest an over-the-counter medicine such as acetaminophen (sample brand name: Tylenol), ibuprofen (sample brand names: Advil, Motrin), or naproxen (sample brand name: Aleve). There is also a medicine that combines acetaminophen, aspirin, and caffeine (sample brand name: Excedrin).  For more severe migraines, there are prescription medicines that can help. Some, such as medicines called triptans, help to relieve the pain from a migraine attack. Other prescription medicines can help to make migraine attacks happen less often. If you have severe nausea or " vomiting with your migraines, there are medicines that can help with that, too.   Do not try to treat frequent migraines on your own with non-prescription pain medicines. Taking non-prescription pain medicines too often can actually cause more headaches later.  What if I want to get pregnant? -- If you want to get pregnant, talk to your doctor or nurse about it before you start trying. Some medicines used to treat and prevent migraines are not safe during pregnancy, so you might need to switch medicines before you get pregnant.  Some women notice that their migraines actually get better during pregnancy and breastfeeding. This is related to hormonal changes in the body.  All topics are updated as new evidence becomes available and our peer review process is complete.  This topic retrieved from obopay on: Sep 21, 2021.  Topic 410634 Version 5.0  Release: 29.4.2 - C29.263  © 2021 UpToDate, Inc. and/or its affiliates. All rights reserved.  Consumer Information Use and Disclaimer   This information is not specific medical advice and does not replace information you receive from your health care provider. This is only a brief summary of general information. It does NOT include all information about conditions, illnesses, injuries, tests, procedures, treatments, therapies, discharge instructions or life-style choices that may apply to you. You must talk with your health care provider for complete information about your health and treatment options. This information should not be used to decide whether or not to accept your health care provider's advice, instructions or recommendations. Only your health care provider has the knowledge and training to provide advice that is right for you. The use of this information is governed by the [employer] End User License Agreement, available at https://www.SavvySync.Mendocino Software/en/solutions/ReefEdge/about/eliane.The use of obopay content is governed by the obopay Terms of Use. ©2021  [employer]. All rights reserved.  Copyright   © 2021 [employer], Inc. and/or its affiliates. All rights reserved.

## 2023-12-06 ENCOUNTER — OFFICE VISIT (OUTPATIENT)
Dept: PAIN MEDICINE | Facility: CLINIC | Age: 68
End: 2023-12-06
Payer: OTHER GOVERNMENT

## 2023-12-06 VITALS
SYSTOLIC BLOOD PRESSURE: 137 MMHG | WEIGHT: 216 LBS | DIASTOLIC BLOOD PRESSURE: 73 MMHG | HEIGHT: 68 IN | TEMPERATURE: 98 F | HEART RATE: 71 BPM | BODY MASS INDEX: 32.74 KG/M2

## 2023-12-06 DIAGNOSIS — M54.16 LUMBAR RADICULOPATHY: ICD-10-CM

## 2023-12-06 DIAGNOSIS — M47.816 LUMBAR SPONDYLOSIS: Primary | ICD-10-CM

## 2023-12-06 PROCEDURE — 99213 OFFICE O/P EST LOW 20 MIN: CPT | Mod: ,,, | Performed by: NURSE PRACTITIONER

## 2023-12-06 PROCEDURE — 99213 PR OFFICE/OUTPT VISIT, EST, LEVL III, 20-29 MIN: ICD-10-PCS | Mod: ,,, | Performed by: NURSE PRACTITIONER

## 2023-12-06 NOTE — PROGRESS NOTES
ADMISSION HISTORY & PHYSICAL    SUBJECTIVE:    CHIEF COMPLAINT: Back Pain (Pre-op for bilateral TFESI L3-5 12/20/23, OTC and RX medication for relief, pain level 7/10)       History of Present Illness: 68 y.o. male presents today for preoperative evaluation for diagnostic bilateral medial branch block L3-5 on 12/20/2023. I reviewed the indications for procedure. The risks and benefits of the proposed and alternative treatments were discussed with the patient. Questions pertinent to the procedure were solicited and answered. No assurances were given. Informed consent was obtained. The patient expressed good understanding and wished to proceed with scheduling the procedure.     Review of Systems:   Constitutional: No fever, weakness, or fatigue.   Ear/Nose/Mouth/Throat: No nasal congestion or sore throat. With antibiotics to be started to day for two weeks  Respiratory: No shortness of breath or cough.   Cardiovascular: No chest pain, palpitations, or peripheral edema.   Gastrointestinal: No nausea, vomiting, or abdominal pain.   Genitourinary: No dysuria.  Musculoskeletal:  Bilateral low back pain worse with prolonged standing, walking, household chores twisting at the waist and leaning back.    Past Surgical History:   Procedure Laterality Date    CHOLECYSTECTOMY          Past Medical History:   Diagnosis Date    '     Bell palsy     Chronic neck pain     Headache     Neuralgia     Unspecified viral hepatitis B without hepatic coma         OBJECTIVE:    Vitals:    12/06/23 1443   BP: 137/73   Pulse: 71   Temp: 97.5 °F (36.4 °C)    Pain score 7/10         Physical Exam:   General: Well-developed, well-nourished.  Neuro: Alert and oriented x 3.  Psych: Normal mood and affect.  HEENT: Normocephalic. PERRLA EOM intact. Nose and throat clear.  Lungs: Clear to auscultation and percussion.  Heart: Regular rate and rhythm   Abdomen: Soft non-tender. Bowel sounds positive. No rebound tenderness.  Skin: No rashes or open  wounds  Musculoskeletal:  Antalgic gait limited range of motion and pain with lateral rotation at the waist and lumbar extension.  No pain with lumbar flexion.  Bilateral upper extremity and lower extremity strength 5/5.  Sensory deprivation to bilateral upper or lower extremities.    ASSESSMENT:  1. Lumbar spondylosis    2. Lumbar radiculopathy       PLAN:  Plan for to proceed with diagnostic bilateral medial branch block L3-5 on 12/20/2023. The patient has been given preoperative instructions and prescriptions for post-operative medication. Post-operative appointment is scheduled for 2 weeks.  Patient was advised to hold the alpha lipoic acid, aspirin 81 mg and multivitamins 7 days prior to procedure and then resume day after his injection. He   verified understanding and is not taking any other blood thinners. Patient has a [employer] cardiac defibrillator and states compatible with RFA and he uses a magnet to deactivate device per the [employer] rep. If patient qualifies for this, the [employer]  representative will be notified to assist during the RFA procedure.

## 2023-12-06 NOTE — PATIENT INSTRUCTIONS
Patient was advised to hold the alpha lipoic acid, aspirin 81 mg, cinnamon and multivitamins 7 days prior to procedure and then resume day after his injection

## 2023-12-12 ENCOUNTER — TELEPHONE (OUTPATIENT)
Dept: NEUROLOGY | Facility: CLINIC | Age: 68
End: 2023-12-12
Payer: OTHER GOVERNMENT

## 2023-12-12 DIAGNOSIS — G43.909 EPISODIC MIGRAINE: ICD-10-CM

## 2023-12-18 ENCOUNTER — TELEPHONE (OUTPATIENT)
Dept: NEUROLOGY | Facility: CLINIC | Age: 68
End: 2023-12-18
Payer: OTHER GOVERNMENT

## 2023-12-18 NOTE — TELEPHONE ENCOUNTER
Pt c/o migraine that is located in the back of his head for the past 5 days. Pt reports experiencing a pulsing sensation in the back of his head.  Pt has been taking Nurtec every other day & has taken Aspirin with no relief of migraine.  Pt denies blurry/double vision but does report burning sensation in eyes. Pt also c/o nausea, but denies vomiting.    LOV 11/20/2023  NOV 11/18/2024  # [phone]

## 2023-12-19 ENCOUNTER — TELEPHONE (OUTPATIENT)
Dept: NEUROLOGY | Facility: CLINIC | Age: 68
End: 2023-12-19

## 2023-12-19 ENCOUNTER — OFFICE VISIT (OUTPATIENT)
Dept: NEUROLOGY | Facility: CLINIC | Age: 68
End: 2023-12-19
Payer: OTHER GOVERNMENT

## 2023-12-19 VITALS
DIASTOLIC BLOOD PRESSURE: 80 MMHG | HEIGHT: 68 IN | WEIGHT: 216 LBS | BODY MASS INDEX: 32.74 KG/M2 | SYSTOLIC BLOOD PRESSURE: 140 MMHG

## 2023-12-19 DIAGNOSIS — G43.709 CHRONIC MIGRAINE WITHOUT AURA WITHOUT STATUS MIGRAINOSUS, NOT INTRACTABLE: Primary | ICD-10-CM

## 2023-12-19 PROCEDURE — 99214 OFFICE O/P EST MOD 30 MIN: CPT | Mod: S$PBB,,, | Performed by: NURSE PRACTITIONER

## 2023-12-19 PROCEDURE — 99999 PR PBB SHADOW E&M-EST. PATIENT-LVL V: CPT | Mod: PBBFAC,,, | Performed by: NURSE PRACTITIONER

## 2023-12-19 PROCEDURE — 99999 PR PBB SHADOW E&M-EST. PATIENT-LVL V: ICD-10-PCS | Mod: PBBFAC,,, | Performed by: NURSE PRACTITIONER

## 2023-12-19 PROCEDURE — 99214 PR OFFICE/OUTPT VISIT, EST, LEVL IV, 30-39 MIN: ICD-10-PCS | Mod: S$PBB,,, | Performed by: NURSE PRACTITIONER

## 2023-12-19 PROCEDURE — 99215 OFFICE O/P EST HI 40 MIN: CPT | Mod: PBBFAC | Performed by: NURSE PRACTITIONER

## 2023-12-19 NOTE — TELEPHONE ENCOUNTER
Jen, pharmacist @ [facility] called in regards to pt's Rx of Aimovig.  She reports pt dropped of printed Rx for Aimovig to their  & they attempted to fax Rx to [facility] pharmacy but have been advised it was not received.  She is inquiring if we can e-scribe Rx of Aimovig to [facility].    LOV 12/19/2023 (today)  NOV 04/17/2024  # [phone] extension [phone]

## 2023-12-19 NOTE — PROGRESS NOTES
Subjective:       Patient ID: Wen Morris is a 68 y.o. male.    Chief Complaint: Migraine    HPI  On Nurtec qod (amongst other meds)  Prev tried: gabapentin, fluoxetine, coreg, lyrica, flexeril, robaxin, nurtec  ONB have prev not provided relief  Tried steroids - didn't work, plus he's diabetic  H/o CAD & reported cva - triptans contraindicated      On his 6th day of a migraine...  Throbbing, L occipital pain that radiates to bilat temples  +photo/phonophobia/n/v    Review of Systems   A 14pt ROS was reviewed & is negative unless otherwise documented in the HPI  Objective:      Physical Exam    GENERAL: NAD, calm, cooperative, appropriate, smiling today  Awake/alert  Well groomed  RESP: CTAB  HEART: RRR  No LE edema  MENTAL STATUS: oriented, follow commands reliably  SPEECH/LANGUAGE: clear, fluent  CN:  Perrla, eomi, vff, gaze conjugate  No tactile or motor facial asymmetry  Tongue protrudes midline  Motor: no focal weakness  Cerebellar: no tremor or dysmetria  Sensory: normal to tactile stim/vibration  DTRs: normal +2, symmetric  Gait: steady    Problem List Items Addressed This Visit          Neuro    Chronic migraine without aura without status migrainosus, not intractable - Primary (Chronic)       PLAN:  Switch to Aimovig 140mg/mo  Stop nurtec qod dosing once aimovig started; may use as prn if needed  F/u 4mo    TAMAR Rojas        ADDENDUM 1/5/24 @ 0915  [place] changed on 1[place]1/24  Preferred medication is now ajovy  Aimovig d/c'd; ajovy ordered.    TAMAR Rojas

## 2023-12-19 NOTE — PATIENT INSTRUCTIONS
"Migraines in Adults   The Basics   Written by the doctors and editors at [facility]   What are migraines? -- Migraines are a kind of headache that can also involve other symptoms. Migraines can affect both adults and children. They are more common in women than in men. Migraines often start off mild and then get worse.  What are the symptoms of migraines in adults? -- Symptoms can include:  Headache - The headache gets worse over several hours and is usually throbbing. It often affects 1 side of the head.  Nausea and sometimes vomiting  Feeling sensitive to light and noise - Lying down in a quiet, dark room often helps.  Aura - Some people have something called a migraine "aura." An aura is a symptom or feeling that happens before or during the migraine headache. Each person's aura is different, but in most cases the aura affects the vision. You might see flashing lights, bright spots, or zig-zag lines, or lose part of your vision. Or you might have numbness and tingling of the lips, lower face, and fingers of 1 hand. Some people hear sounds or have ringing in their ears as part of their aura. The aura usually lasts a few minutes to an hour and then goes away, but most often lasts 15 to 30 minutes.  Women who get migraines with aura usually cannot take birth control pills. That's because they might increase the risk of stroke.  Many people get other symptoms of migraine that happen several hours or even a day before the headache. Doctors call these "premonitory" or "prodromal" symptoms. They might include yawning, feeling depressed, irritability, food cravings, constipation, or a stiff neck.  Is there a test for migraines? -- No. There is no test. But your doctor should be able to tell if you have migraines by doing an exam and learning about your symptoms.  Should I see a doctor or nurse? -- Yes. If you think you are having migraines, you should talk to your doctor or nurse. You should also see a doctor or nurse if " "your migraines get worse or more frequent, or if you have new symptoms.  Is there anything I can do to prevent migraines? -- Yes. Some people find that their migraines are triggered by certain things. If you can avoid some of these things, you can lower your chances of getting migraines.  You can also keep a "headache calendar." In the calendar, write down every time you have a migraine and what you ate and did before it started. That way you can find out if there is anything you should avoid eating or doing. You can also write down what medicine you took and whether or not it helped.  Common migraine triggers include:  Stress  Hormonal changes  Skipping meals or not eating enough  Changes in the weather  Sleeping too much or too little  Bright or flashing lights  Drinking alcohol  Certain drinks or foods, such as red wine, aged cheese, and hot dogs  If your migraines are frequent or severe, your doctor can suggest others ways to help prevent them. For example, it might help to learn relaxation techniques and ways to manage stress. There are also medicines that can help.  Some women get migraines just before or during their period. Medicine can help with this, too.  How are migraines treated? -- There are many different medicines that can help with migraines. Your doctor can help you find the best treatment for your situation.  For mild migraines, your doctor might suggest an over-the-counter medicine such as acetaminophen (sample brand name: Tylenol), ibuprofen (sample brand names: Advil, Motrin), or naproxen (sample brand name: Aleve). There is also a medicine that combines acetaminophen, aspirin, and caffeine (sample brand name: Excedrin).  For more severe migraines, there are prescription medicines that can help. Some, such as medicines called triptans, help to relieve the pain from a migraine attack. Other prescription medicines can help to make migraine attacks happen less often. If you have severe nausea or " vomiting with your migraines, there are medicines that can help with that, too.   Do not try to treat frequent migraines on your own with non-prescription pain medicines. Taking non-prescription pain medicines too often can actually cause more headaches later.  What if I want to get pregnant? -- If you want to get pregnant, talk to your doctor or nurse about it before you start trying. Some medicines used to treat and prevent migraines are not safe during pregnancy, so you might need to switch medicines before you get pregnant.  Some women notice that their migraines actually get better during pregnancy and breastfeeding. This is related to hormonal changes in the body.  All topics are updated as new evidence becomes available and our peer review process is complete.  This topic retrieved from [employer] on: Sep 21, 2021.  Topic 070729 Version 5.0  Release: 29.4.2 - C29.263  © 2021 UpToDate, Inc. and/or its affiliates. All rights reserved.  Consumer Information Use and Disclaimer   This information is not specific medical advice and does not replace information you receive from your health care provider. This is only a brief summary of general information. It does NOT include all information about conditions, illnesses, injuries, tests, procedures, treatments, therapies, discharge instructions or life-style choices that may apply to you. You must talk with your health care provider for complete information about your health and treatment options. This information should not be used to decide whether or not to accept your health care provider's advice, instructions or recommendations. Only your health care provider has the knowledge and training to provide advice that is right for you. The use of this information is governed by the [employer] End User License Agreement, available at https://www.RedZone Robotics.Caro Nut/en/solutions/Prixel/about/eliane.The use of [employer] content is governed by the [employer] Terms of Use. ©2021  [employer], Inc. All rights reserved.  Copyright   © 2021 Sfletter.[employer], Inc. and/or its affiliates. All rights reserved.

## 2024-01-05 DIAGNOSIS — G43.709 CHRONIC MIGRAINE WITHOUT AURA WITHOUT STATUS MIGRAINOSUS, NOT INTRACTABLE: ICD-10-CM

## 2024-01-05 RX ORDER — FREMANEZUMAB-VFRM 225 MG/1.5ML
225 INJECTION SUBCUTANEOUS
Qty: 1.5 ML | Refills: 12 | Status: SHIPPED | OUTPATIENT
Start: 2024-01-05 | End: 2024-01-05

## 2024-01-18 DIAGNOSIS — M47.816 LUMBAR SPONDYLOSIS: ICD-10-CM

## 2024-01-18 DIAGNOSIS — M51.36 DDD (DEGENERATIVE DISC DISEASE), LUMBAR: Primary | ICD-10-CM

## 2024-02-06 ENCOUNTER — TELEPHONE (OUTPATIENT)
Dept: PAIN MEDICINE | Facility: CLINIC | Age: 69
End: 2024-02-06
Payer: OTHER GOVERNMENT

## 2024-02-06 NOTE — TELEPHONE ENCOUNTER
Spoke with Mr Kenneth Soto with [employer] about turning off Mr Wen's defibrillator, states he will bring a magnet in the next upcoming weeks to place over defibrillator to turn off during procedure on 2/28/24 and discuss with Dr. Lala if she is comfortable and in agreement.  Kenneth Soto  P:[phone]

## 2024-02-15 ENCOUNTER — TELEPHONE (OUTPATIENT)
Dept: NEUROLOGY | Facility: CLINIC | Age: 69
End: 2024-02-15
Payer: OTHER GOVERNMENT
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
## 2024-02-22 ENCOUNTER — OFFICE VISIT (OUTPATIENT)
Dept: PAIN MEDICINE | Facility: CLINIC | Age: 69
End: 2024-02-22
Payer: OTHER GOVERNMENT

## 2024-02-22 VITALS
HEART RATE: 78 BPM | DIASTOLIC BLOOD PRESSURE: 83 MMHG | SYSTOLIC BLOOD PRESSURE: 150 MMHG | WEIGHT: 216 LBS | BODY MASS INDEX: 32.74 KG/M2 | TEMPERATURE: 98 F | HEIGHT: 68 IN

## 2024-02-22 DIAGNOSIS — M47.816 LUMBAR SPONDYLOSIS: ICD-10-CM

## 2024-02-22 DIAGNOSIS — M51.36 LUMBAR DEGENERATIVE DISC DISEASE: ICD-10-CM

## 2024-02-22 DIAGNOSIS — G89.29 CHRONIC BACK PAIN GREATER THAN 3 MONTHS DURATION: ICD-10-CM

## 2024-02-22 DIAGNOSIS — M51.36 DEGENERATION, INTERVERTEBRAL DISC, LUMBAR: Primary | ICD-10-CM

## 2024-02-22 DIAGNOSIS — M54.16 LUMBAR RADICULITIS: ICD-10-CM

## 2024-02-22 DIAGNOSIS — M54.9 CHRONIC BACK PAIN GREATER THAN 3 MONTHS DURATION: ICD-10-CM

## 2024-02-22 PROCEDURE — 99213 OFFICE O/P EST LOW 20 MIN: CPT | Mod: ,,, | Performed by: ANESTHESIOLOGY

## 2024-02-22 RX ORDER — FLUTICASONE PROPIONATE 50 MCG
SPRAY, SUSPENSION (ML) NASAL
COMMUNITY
Start: 2024-02-08

## 2024-02-22 RX ORDER — CETIRIZINE HYDROCHLORIDE 10 MG/1
10 TABLET ORAL
COMMUNITY
Start: 2024-02-08

## 2024-02-22 RX ORDER — KETOROLAC TROMETHAMINE 10 MG/1
TABLET, FILM COATED ORAL
COMMUNITY
End: 2024-05-22

## 2024-02-22 NOTE — LETTER
February 22, 2024       [facility]  [address], [address]  [place] [address]  Phone: [phone]  Fax: [phone]       Patient: Wen Morris   YOB: 1955  Date of Visit: 02/22/2024    To Whom It May Concern:    Ariela Morris  was at [facility] on 02/22/2024. The patient may return to work/school on 2/23/24. If you have any questions or concerns, or if I can be of further assistance, please do not hesitate to contact me.    Sincerely,  Pauline Lala MD

## 2024-02-22 NOTE — H&P (VIEW-ONLY)
ADMISSION HISTORY & PHYSICAL    SUBJECTIVE:    CHIEF COMPLAINT: Low-back Pain (Pre op MBB Malcom L3-5 C/O  pain level 8, not taking pain meds.)       History of Present Illness: 68 y.o. male presents today for preoperative evaluation for diagnostic bilateral medial branch block L3-5 on 12/20/2023. I reviewed the indications for procedure. The risks and benefits of the proposed and alternative treatments were discussed with the patient. Questions pertinent to the procedure were solicited and answered. No assurances were given. Informed consent was obtained. The patient expressed good understanding and wished to proceed with scheduling the procedure.     Review of Systems:   Constitutional: No fever, weakness, or fatigue.   Ear/Nose/Mouth/Throat: No nasal congestion or sore throat. With antibiotics to be started to day for two weeks  Respiratory: No shortness of breath or cough.   Cardiovascular: No chest pain, palpitations, or peripheral edema.   Gastrointestinal: No nausea, vomiting, or abdominal pain.   Genitourinary: No dysuria.  Musculoskeletal:  Bilateral low back pain worse with prolonged standing, walking, household chores twisting at the waist and leaning back.    Past Surgical History:   Procedure Laterality Date    CARDIAC DEFIBRILLATOR PLACEMENT  2023    Dr. Nikolas Varela    CHOLECYSTECTOMY      COLONOSCOPY      EYE SURGERY      NECK SURGERY      TONSILLECTOMY          Past Medical History:   Diagnosis Date    Arthritis     Bell palsy     Chronic neck pain     DM (diabetes mellitus)     Glaucoma     Headache     Hepatitis B     HTN (hypertension)     Memory loss     Neuralgia     Sleep apnea     does not use machine    Unspecified viral hepatitis B without hepatic coma         OBJECTIVE:    Vitals:    02/22/24 1003   BP: (!) 150/83   Pulse: 78   Temp: 98 °F (36.7 °C)    Pain score 7/10         Physical Exam:   General: Well-developed, well-nourished.  Neuro: Alert and oriented x 3.  Psych: Normal mood and  affect.  HEENT: Normocephalic. PERRLA EOM intact. Nose and throat clear.  Lungs:  Nonlabored breathing  Heart: Regular rate and rhythm   Abdomen: Soft non-tender.   Skin: No rashes or open wounds  Musculoskeletal:  Antalgic gait limited range of motion and pain with lateral rotation at the waist and lumbar extension.  No pain with lumbar flexion.  Bilateral upper extremity and lower extremity strength 5/5.  Sensory deprivation to bilateral upper or lower extremities.    ASSESSMENT:  1. Degeneration, intervertebral disc, lumbar    2. Lumbar spondylosis    3. Lumbar radiculitis    4. Lumbar degenerative disc disease    5. Chronic back pain greater than 3 months duration       PLAN:  Plan for to proceed with diagnostic bilateral medial branch block L3-5. The patient has been given preoperative instructions and prescriptions for post-operative medication. Post-operative appointment is scheduled for 2 weeks.  Patient was advised to hold the alpha lipoic acid, aspirin 81 mg and multivitamins 7 days prior to procedure and then resume day after his injection. He   verified understanding and is not taking any other blood thinners.

## 2024-02-23 ENCOUNTER — ANESTHESIA EVENT (OUTPATIENT)
Dept: SURGERY | Facility: HOSPITAL | Age: 69
End: 2024-02-23
Payer: OTHER GOVERNMENT

## 2024-02-28 ENCOUNTER — HOSPITAL ENCOUNTER (OUTPATIENT)
Facility: HOSPITAL | Age: 69
Discharge: HOME | End: 2024-02-28
Attending: ANESTHESIOLOGY | Admitting: ANESTHESIOLOGY
Payer: OTHER GOVERNMENT

## 2024-02-28 ENCOUNTER — ANESTHESIA (OUTPATIENT)
Dept: SURGERY | Facility: HOSPITAL | Age: 69
End: 2024-02-28
Payer: OTHER GOVERNMENT

## 2024-02-28 ENCOUNTER — TELEPHONE (OUTPATIENT)
Dept: PAIN MEDICINE | Facility: CLINIC | Age: 69
End: 2024-02-28
Payer: OTHER GOVERNMENT

## 2024-02-28 DIAGNOSIS — G89.29 CHRONIC BACK PAIN GREATER THAN 3 MONTHS DURATION: Primary | ICD-10-CM

## 2024-02-28 DIAGNOSIS — M54.9 CHRONIC BACK PAIN GREATER THAN 3 MONTHS DURATION: Primary | ICD-10-CM

## 2024-02-28 LAB — POCT GLUCOSE: 168 MG/DL (ref 70–110)

## 2024-02-28 PROCEDURE — 63600175 PHARM REV CODE 636 W HCPCS: Mod: JZ,JG | Performed by: ANESTHESIOLOGY

## 2024-02-28 PROCEDURE — 82962 GLUCOSE BLOOD TEST: CPT | Performed by: ANESTHESIOLOGY

## 2024-02-28 PROCEDURE — 25000003 PHARM REV CODE 250: Performed by: ANESTHESIOLOGY

## 2024-02-28 PROCEDURE — D9220A PRA ANESTHESIA: Mod: 23,ANES,, | Performed by: STUDENT IN AN ORGANIZED HEALTH CARE EDUCATION/TRAINING PROGRAM

## 2024-02-28 PROCEDURE — 64494 INJ PARAVERT F JNT L/S 2 LEV: CPT | Mod: 50,,, | Performed by: ANESTHESIOLOGY

## 2024-02-28 PROCEDURE — 63600175 PHARM REV CODE 636 W HCPCS

## 2024-02-28 PROCEDURE — 37000008 HC ANESTHESIA 1ST 15 MINUTES: Performed by: ANESTHESIOLOGY

## 2024-02-28 PROCEDURE — 25000003 PHARM REV CODE 250

## 2024-02-28 PROCEDURE — 64494 INJ PARAVERT F JNT L/S 2 LEV: CPT | Mod: 50 | Performed by: ANESTHESIOLOGY

## 2024-02-28 PROCEDURE — 64493 INJ PARAVERT F JNT L/S 1 LEV: CPT | Mod: 50 | Performed by: ANESTHESIOLOGY

## 2024-02-28 PROCEDURE — D9220A PRA ANESTHESIA: Mod: 23,CRNA,,

## 2024-02-28 PROCEDURE — 64493 INJ PARAVERT F JNT L/S 1 LEV: CPT | Mod: 50,,, | Performed by: ANESTHESIOLOGY

## 2024-02-28 RX ORDER — LIDOCAINE HYDROCHLORIDE 10 MG/ML
INJECTION, SOLUTION EPIDURAL; INFILTRATION; INTRACAUDAL; PERINEURAL
Status: DISCONTINUED | OUTPATIENT
Start: 2024-02-28 | End: 2024-02-28

## 2024-02-28 RX ORDER — BUPIVACAINE HYDROCHLORIDE 2.5 MG/ML
INJECTION, SOLUTION EPIDURAL; INFILTRATION; INTRACAUDAL
Status: DISCONTINUED
Start: 2024-02-28 | End: 2024-02-28 | Stop reason: HOSPADM

## 2024-02-28 RX ORDER — BUPIVACAINE HYDROCHLORIDE 2.5 MG/ML
INJECTION, SOLUTION EPIDURAL; INFILTRATION; INTRACAUDAL
Status: DISCONTINUED | OUTPATIENT
Start: 2024-02-28 | End: 2024-02-28 | Stop reason: HOSPADM

## 2024-02-28 RX ORDER — LIDOCAINE HYDROCHLORIDE 10 MG/ML
INJECTION, SOLUTION EPIDURAL; INFILTRATION; INTRACAUDAL; PERINEURAL
Status: DISCONTINUED | OUTPATIENT
Start: 2024-02-28 | End: 2024-02-28 | Stop reason: HOSPADM

## 2024-02-28 RX ORDER — TRIAMCINOLONE ACETONIDE 40 MG/ML
INJECTION, SUSPENSION INTRA-ARTICULAR; INTRAMUSCULAR
Status: DISCONTINUED
Start: 2024-02-28 | End: 2024-02-28 | Stop reason: HOSPADM

## 2024-02-28 RX ORDER — LIDOCAINE HYDROCHLORIDE 10 MG/ML
INJECTION, SOLUTION EPIDURAL; INFILTRATION; INTRACAUDAL; PERINEURAL
Status: DISCONTINUED
Start: 2024-02-28 | End: 2024-02-28 | Stop reason: HOSPADM

## 2024-02-28 RX ORDER — PROPOFOL 10 MG/ML
VIAL (ML) INTRAVENOUS
Status: DISCONTINUED | OUTPATIENT
Start: 2024-02-28 | End: 2024-02-28

## 2024-02-28 RX ADMIN — PROPOFOL 20 MG: 10 INJECTION, EMULSION INTRAVENOUS at 09:02

## 2024-02-28 RX ADMIN — PROPOFOL 80 MG: 10 INJECTION, EMULSION INTRAVENOUS at 09:02

## 2024-02-28 RX ADMIN — LIDOCAINE HYDROCHLORIDE 50 MG: 10 INJECTION, SOLUTION EPIDURAL; INFILTRATION; INTRACAUDAL; PERINEURAL at 09:02

## 2024-02-28 NOTE — TELEPHONE ENCOUNTER
Void message below Dr Lala I have consulted with Bernoica she advised pt to resume medications.    Dr Lala,   pt contacted office wanting to know when should he resume his regular medication such as coreg, entecavir, lisinopril omperozole, tenofovir, latanoprost? should he resume this evening or tomorrow am. he had his procedure with you today.   Please advise thanks

## 2024-02-28 NOTE — TRANSFER OF CARE
"Anesthesia Transfer of Care Note    Patient: Wen Morris    Procedure(s) Performed: Procedure(s) (LRB):  Block-nerve-medial branch-lumbar (Bilateral)    Patient location: OPS    Anesthesia Type: general and MAC    Transport from OR: Transported from OR on room air with adequate spontaneous ventilation    Post pain: adequate analgesia    Post assessment: no apparent anesthetic complications    Post vital signs: stable    Level of consciousness: awake    Nausea/Vomiting: no nausea/vomiting    Complications: none    Transfer of care protocol was followedComments: TIVA      Last vitals: Visit Vitals  BP (!) 166/85   Pulse 73   Temp 35.9 °C (96.6 °F)   Resp 20   Ht 5' 8" (1.727 m)   Wt 95.2 kg (209 lb 14.1 oz)   SpO2 97%   BMI 31.91 kg/m²     "

## 2024-02-28 NOTE — DISCHARGE SUMMARY
[facility] - Periop Services  Discharge Note  Short Stay    Procedure(s) (LRB):  Block-nerve-medial branch-lumbar (Bilateral)      OUTCOME: Patient tolerated treatment/procedure well without complication and is now ready for discharge.    DISPOSITION: Home or Self Care    FINAL DIAGNOSIS:  <principal problem not specified>    FOLLOWUP: In clinic    DISCHARGE INSTRUCTIONS:  No discharge procedures on file.     TIME SPENT ON DISCHARGE: 5 minutes

## 2024-02-28 NOTE — ANESTHESIA PREPROCEDURE EVALUATION
02/28/2024  Wen Morris is a 68 y.o., male.    Other Medical History   Memory loss Chronic neck pain   Headache Bell palsy   Neuralgia Unspecified viral hepatitis B without hepatic coma   HTN (hypertension) DM (diabetes mellitus)   Hepatitis B Arthritis   Sleep apnea Glaucoma     Surgical History  CHOLECYSTECTOMY NECK SURGERY   CARDIAC DEFIBRILLATOR PLACEMENT COLONOSCOPY   EYE SURGERY TONSILLECTOMY     Lvef 20%, mod AR, ICD in place  Asa4 for severe reduction in LVEF    Pre-op Assessment    I have reviewed the Patient Summary Reports.     I have reviewed the Nursing Notes. I have reviewed the NPO Status.   I have reviewed the Medications.     Review of Systems  Anesthesia Hx:               Denies Personal Hx of Anesthesia complications.                    Cardiovascular:     Hypertension                                        Pulmonary:        Sleep Apnea                Hepatic/GI:       Hepatitis, B           Musculoskeletal:  Arthritis               Neurological:    Neuromuscular Disease,  Headaches                                 Endocrine:        Obesity / BMI > 30      Physical Exam  General: Well nourished, Cooperative and Alert    Airway:  Mallampati: II   Mouth Opening: Normal  TM Distance: Normal  Tongue: Normal    Chest/Lungs:  Normal Respiratory Rate    Heart:  Rate: Normal        Anesthesia Plan  Type of Anesthesia, risks & benefits discussed:    Anesthesia Type: Gen Natural Airway  Intra-op Monitoring Plan: Standard ASA Monitors  Post Op Pain Control Plan: IV/PO Opioids PRN  Induction:  IV  Informed Consent: Informed consent signed with the Patient and all parties understand the risks and agree with anesthesia plan.  All questions answered.   ASA Score: 4  Day of Surgery Review of History & Physical: H&P Update referred to the surgeon/provider.    Ready For Surgery From Anesthesia  Perspective.     .

## 2024-02-28 NOTE — OP NOTE
Bilateral L3-5 medial branch blocks    Pre-Procedure Diagnoses:  1. Chronic pain syndrome  2. Chronic Low back pain  3. Lumbar facet arthropathy    Post-Procedure Diagnoses:  1. Chronic pain syndrome  2. Chronic Low back pain  3. Lumbar facet arthropathy    Anesthesia:  Local and MAC    Estimated Blood Loss:  None    Complications:  None    Informed Consent:  The procedure, risks, benefits, and alternatives were discussed with the patient. There were no contraindications to the procedure. The patient expressed understanding and agreed to proceed. Fully informed written consent was obtained.     Description of the Procedure:  The patient was taken to the operating room. IV access was obtained prior to the start of the procedure. The patient was positioned prone on the fluoroscopy table. Continuous hemodynamic monitoring was initiated and continued throughout the duration of the procedure. The skin overlying the lumbosacral spine was prepped with Chloraprep and draped into a sterile field. Fluoroscopy was used to identify the locations of the left side L3, L4, and L5 medial branch nerves at the junctions of the superior articular processes and transverse processes of L4, L5, and the sacral ala, respectively. Skin anesthesia was achieved using 0.5 mL of 1% lidocaine over each injection site. A 22-gauge 3.5-inch Quinke spinal needle was slowly inserted and advanced under intermittent fluoroscopic guidance until it made bony contact at the target sites. Proper needle position was confirmed under AP, oblique, and lateral fluoroscopic views. Negative aspiration for blood or CSF was confirmed. Then 0.5 mL of 0.25% bupivacaine was easily injected at each level. There was no pain on injection. The needles were removed intact and bleeding was nil. The same procedure was repeated in identical fashion on the right side. Sterile bandages were applied. The patient was taken to the recovery room for further observation in stable  condition. The patient was then discharged home without any complications.

## 2024-02-28 NOTE — DISCHARGE INSTRUCTIONS
MEDIAL BRANCH BLOCK/ EPIDURAL STEROID INJECTION, CARE AFTER    These instructions give you information on caring for yourself after your procedure. Your doctor may also give you specific instructions. Call your doctor if you have any problems or questions after your procedure.     HOME CARE  Do not drive or use any machinery for the next 24 hours.  Do not do any hard physical activity for the next 24 hours  No heavy lifting for one week  Apply ice pack for comfort  Do not use a heating pad in area of injection  You may go back to eating as usual  Remove bandaids tomorrow and then you may shower  No tub soaking for 3-5 days    SIDE EFFECTS THAT COULD HAPPEN UP TO 4 HOURS AFTER THE INJECTION  If you have leg weakness or numbness, have someone help you walk. If the weakness or numbness does not go away, or if it gets worse go to the emergency department.  If you have dizziness, lie down right away. This usually helps. Sit up slowly and then when you have been sitting for a few minutes then stand up.  If you have a mild headache, drink fluids (especially drinks with caffeine) Call your doctor if the headache gets worse or persists.  When the numbing medicine wears off you may feel some discomfort where you had the shot. It usually only lasts for a few days. You may put ice over the injection site. Leave ice on for 20 minutes at a time and protect your skin during use.   You may feel minor back pain and stiffness at the site of the shot. Call your doctor if this pain gets worse or does not improve. You may feel nauseous or vomit several hours after your procedure. If this happens, try drinking small amounts of clear liquids until you feel better. If you continue to feel nauseated or continue vomiting, get help right away.    It is possible to experience the following effects associated with the specific agent after injection:      Iodine-based contrast agents:   Allergic reaction (itching, hives, widespread redness and  swelling beyond the injection site)    Corticosteroids:  Allergic reaction  **Increased blood sugar levels  Increased blood pressure  Mood swings  Temporary flushing or redness  Inability to sleep    Steroids may take several days to start working. The shot usually helps leg pain more than back pain. The shot will not fix what is causing the pain but may take away some of the pain. This pain relief may last from 2 weeks to 6 months.     GET HELP RIGHT AWAY IF:  You have very bad pain, headache or neck pain or stiffness  There is a change in your vision (double or blurry vision)  You have a fever over 101 or chills  You have swelling or redness at the injection site  You get weaker  You are not able to control your bladder or bowels  You are not able to urinateMEDIAL BRANCH BLOCK/ EPIDURAL STEROID INJECTION, CARE AFTER    These instructions give you information on caring for yourself after your procedure. Your doctor may also give you specific instructions. Call your doctor if you have any problems or questions after your procedure.     HOME CARE  Do not drive or use any machinery for the next 24 hours.  Do not do any hard physical activity for the next 24 hours  No heavy lifting for one week  Apply ice pack for comfort  Do not use a heating pad in area of injection  You may go back to eating as usual  Remove bandaids tomorrow and then you may shower  No tub soaking for 3-5 days    SIDE EFFECTS THAT COULD HAPPEN UP TO 4 HOURS AFTER THE INJECTION  If you have leg weakness or numbness, have someone help you walk. If the weakness or numbness does not go away, or if it gets worse go to the emergency department.  If you have dizziness, lie down right away. This usually helps. Sit up slowly and then when you have been sitting for a few minutes then stand up.  If you have a mild headache, drink fluids (especially drinks with caffeine) Call your doctor if the headache gets worse or persists.  When the numbing medicine wears  off you may feel some discomfort where you had the shot. It usually only lasts for a few days. You may put ice over the injection site. Leave ice on for 20 minutes at a time and protect your skin during use.   You may feel minor back pain and stiffness at the site of the shot. Call your doctor if this pain gets worse or does not improve. You may feel nauseous or vomit several hours after your procedure. If this happens, try drinking small amounts of clear liquids until you feel better. If you continue to feel nauseated or continue vomiting, get help right away.    It is possible to experience the following effects associated with the specific agent after injection:      Iodine-based contrast agents:   Allergic reaction (itching, hives, widespread redness and swelling beyond the injection site)    Corticosteroids:  Allergic reaction  **Increased blood sugar levels  Increased blood pressure  Mood swings  Temporary flushing or redness  Inability to sleep    Steroids may take several days to start working. The shot usually helps leg pain more than back pain. The shot will not fix what is causing the pain but may take away some of the pain. This pain relief may last from 2 weeks to 6 months.     GET HELP RIGHT AWAY IF:  You have very bad pain, headache or neck pain or stiffness  There is a change in your vision (double or blurry vision)  You have a fever over 101 or chills  You have swelling or redness at the injection site  You get weaker  You are not able to control your bladder or bowels  You are not able to urinate

## 2024-03-01 VITALS
HEART RATE: 75 BPM | TEMPERATURE: 97 F | OXYGEN SATURATION: 98 % | WEIGHT: 209.88 LBS | SYSTOLIC BLOOD PRESSURE: 137 MMHG | DIASTOLIC BLOOD PRESSURE: 79 MMHG | HEIGHT: 68 IN | RESPIRATION RATE: 20 BRPM | BODY MASS INDEX: 31.81 KG/M2

## 2024-03-01 NOTE — ANESTHESIA POSTPROCEDURE EVALUATION
Anesthesia Post Evaluation    Patient: Wen Morris    Procedure(s) Performed: Procedure(s) (LRB):  Block-nerve-medial branch-lumbar (Bilateral)    Final Anesthesia Type: general      Patient location during evaluation: PACU  Patient participation: Yes- Able to Participate  Level of consciousness: awake and alert  Post-procedure vital signs: reviewed and stable  Pain management: adequate  Airway patency: patent    PONV status at discharge: No PONV  Anesthetic complications: no      Respiratory status: unassisted  Hydration status: euvolemic  Follow-up not needed.              Vitals Value Taken Time   /79 02/28/24 1016   Temp 36.1 °C (97 °F) 02/28/24 1000   Pulse 78 02/28/24 1020   Resp 20 02/28/24 1015   SpO2 98 % 02/28/24 1020   Vitals shown include unvalidated device data.      No case tracking events are documented in the log.      Pain/Heather Score: No data recorded

## 2024-03-25 ENCOUNTER — OFFICE VISIT (OUTPATIENT)
Dept: PAIN MEDICINE | Facility: CLINIC | Age: 69
End: 2024-03-25
Payer: OTHER GOVERNMENT

## 2024-03-25 VITALS
HEIGHT: 68 IN | WEIGHT: 215 LBS | DIASTOLIC BLOOD PRESSURE: 78 MMHG | HEART RATE: 72 BPM | BODY MASS INDEX: 32.58 KG/M2 | SYSTOLIC BLOOD PRESSURE: 133 MMHG

## 2024-03-25 DIAGNOSIS — M54.16 LUMBAR RADICULITIS: ICD-10-CM

## 2024-03-25 DIAGNOSIS — M47.816 LUMBAR SPONDYLOSIS: ICD-10-CM

## 2024-03-25 DIAGNOSIS — G89.29 CHRONIC BACK PAIN GREATER THAN 3 MONTHS DURATION: ICD-10-CM

## 2024-03-25 DIAGNOSIS — M54.9 CHRONIC BACK PAIN GREATER THAN 3 MONTHS DURATION: ICD-10-CM

## 2024-03-25 DIAGNOSIS — M51.36 DDD (DEGENERATIVE DISC DISEASE), LUMBAR: Primary | ICD-10-CM

## 2024-03-25 PROCEDURE — 99213 OFFICE O/P EST LOW 20 MIN: CPT | Mod: ,,, | Performed by: ANESTHESIOLOGY

## 2024-03-25 RX ORDER — ASPIRIN 81 MG/1
1 TABLET ORAL EVERY MORNING
COMMUNITY

## 2024-03-25 RX ORDER — CYCLOBENZAPRINE HCL 10 MG
10 TABLET ORAL
COMMUNITY
End: 2024-04-22

## 2024-03-25 RX ORDER — CARVEDILOL 25 MG/1
12.5 TABLET ORAL
COMMUNITY

## 2024-03-25 NOTE — PROGRESS NOTES
Pauline Lala MD        PATIENT NAME: Wen Morris  : 1955  DATE: 3/25/24  MRN: [id number]      Billing Provider: Pauline Lala MD  Level of Service:   Patient PCP Information       Provider PCP Type    Naya Whittaker MD General            Reason for Visit / Chief Complaint: Follow-up (MBB L3-L5 24 patient states he is starting to have some pain about a week ago on left lower side. Pain scale 5 )       Update PCP  Update Chief Complaint         History of Present Illness / Problem Focused Workflow     Wen Morris presents to the clinic with Follow-up (MBB L3-L5 24 patient states he is starting to have some pain about a week ago on left lower side. Pain scale 5 )       This is a 68-year-old male who returns to clinic today for follow-up of his chronic low back pain.  He has been trying to avoid epidural steroid injections because they caused his glucose to elevated it was difficult to control.  He is taking Lyrica, Robaxin, [facility]  He was doing physical therapy but states that he ran out of visits.   Complains that his lower back pain is getting worse but he denies radiation down the lower extremities.  He was referred to a [facility] in [place] [facility], but apparently surgery is not recommended at this time.  He underwent lumbar medial branch blocks last month.  He states that he was getting excellent relief until last week.  He denies any injuries that caused the pain to return.  Pain is still not as bad as it was prior to the injections, but has been gradually increasing.    Back Pain    Follow-up        Review of Systems     Review of Systems   Musculoskeletal:  Positive for back pain.   All other systems reviewed and are negative.       Medical / Social / Family History     Past Medical History:   Diagnosis Date    Arthritis     Bell palsy     Chronic neck pain     DM (diabetes mellitus)     Glaucoma     Headache     Hepatitis B     HTN  (hypertension)     Memory loss     Neuralgia     Sleep apnea     does not use machine    Unspecified viral hepatitis B without hepatic coma        Past Surgical History:   Procedure Laterality Date    CARDIAC DEFIBRILLATOR PLACEMENT  2023    Dr. Nikolas Varela    CHOLECYSTECTOMY      COLONOSCOPY      EYE SURGERY      INJECTION OF ANESTHETIC AGENT AROUND MEDIAL BRANCH NERVES INNERVATING LUMBAR FACET JOINT Bilateral 2/28/2024    Procedure: Block-nerve-medial branch-lumbar;  Surgeon: Pauline Lala MD;  Location: [facility] OR;  Service: Pain Management;  Laterality: Bilateral;  MBB Malcom L3-5  contact [employer] [facility] [phone] for magnet placement    NECK SURGERY      TONSILLECTOMY         Social History  Mr. Morris  reports that he quit smoking about 8 years ago. His smoking use included cigarettes. He has never used smokeless tobacco. He reports current alcohol use of about 3.0 - 4.0 standard drinks of alcohol per week. He reports that he does not use drugs.    Family History  Mr.'s Morris family history includes Cancer in his father; Diabetes in his mother.    Medications and Allergies     Medications  Outpatient Medications Marked as Taking for the 3/25/24 encounter (Office Visit) with Pauline Lala MD   Medication Sig Dispense Refill    ALPHA LIPOIC ACID ORAL Take by mouth.      ascorbic acid, vitamin C, (VITAMIN C) 1000 MG tablet Take 1,000 mg by mouth once daily.      aspirin (ECOTRIN) 81 MG EC tablet Take 1 tablet by mouth every morning.      aspirin 81 mg Cap Take 81 mg by mouth once daily.      capsicum 0.075% (ZOSTRIX) 0.075 % topical cream Apply 1 application topically 2 (two) times a day. Apply with gloves      carvediloL (COREG) 25 MG tablet Take 12.5 mg by mouth.      cetirizine (ZYRTEC) 10 MG tablet Take 10 mg by mouth.      chlorpheniramine (CHLOR-TRIMETON) 4 mg tablet Take 4 mg by mouth every 6 (six) hours as needed for Allergies.      cholecalciferol, vitamin D3, 125 mcg (5,000 unit)  capsule Take 1 capsule by mouth once daily.      cyclobenzaprine (FLEXERIL) 10 MG tablet Take 10 mg by mouth 3 (three) times daily as needed for Muscle spasms.      cyclobenzaprine (FLEXERIL) 10 MG tablet Take 10 mg by mouth.      donepeziL (ARICEPT) 10 MG tablet Take 10 mg by mouth every evening.      empagliflozin (JARDIANCE) 25 mg tablet Take 1 tablet by mouth once daily.      entecavir (BARACLUDE) 0.5 MG Tab Take 0.5 mg by mouth once daily.      erenumab-aooe (AIMOVIG) 140 mg/mL autoinjector Inject 1 mL (140 mg total) into the skin every 30 days. 1 mL 12    FLUoxetine 20 MG capsule Take 20 mg by mouth once daily.      fluticasone propionate (FLONASE) 50 mcg/actuation nasal spray by Each Nostril route.      furosemide (LASIX) 20 MG tablet Take 20 mg by mouth once daily.      glipiZIDE (GLUCOTROL) 10 MG tablet Take 10 mg by mouth 2 (two) times daily before meals.      ketorolac (TORADOL) 10 mg tablet TAKE ONE TABLET BY MOUTH EVERY EIGHT HOURS AS NEEDED FOR PAIN AND INFLAMMATION      latanoprost 0.005 % ophthalmic solution Place 1 drop into both eyes every evening.      lisinopriL 10 MG tablet Take 10 mg by mouth 2 (two) times a day.      memantine (NAMENDA) 10 MG Tab Take 5 mg by mouth 2 (two) times daily.      multivit-min/ferrous fumarate (MULTI VITAMIN ORAL) Take 1 tablet by mouth Daily.      omeprazole (PRILOSEC) 20 MG capsule Take 20 mg by mouth once daily.      pregabalin (LYRICA) 100 MG capsule Take 1 capsule (100 mg total) by mouth 2 (two) times a day. (Patient taking differently: Take 100 mg by mouth 3 (three) times daily.) 60 capsule 3    propranoloL (INDERAL) 20 MG tablet Take 10 mg by mouth once daily.      rimegepant 75 mg odt Take 1 tablet (75 mg total) by mouth every other day. Place ODT tablet on the tongue; alternatively the ODT tablet may be placed under the tongue 15 tablet 12    rosuvastatin (CRESTOR) 10 MG tablet Take 10 mg by mouth once daily.      tenofovir disoproxil fumarate (VIREAD) 300  mg Tab Take 300 mg by mouth once daily.      zolpidem (AMBIEN) 10 mg Tab Take 5 mg by mouth every evening.         Allergies  Review of patient's allergies indicates:   Allergen Reactions    Corn Swelling     Throat swelling    Benadryl allergy decongestant      hyperactivity    Hydrocodone-acetaminophen      hyperactivity    Codeine Anxiety     Other reaction(s): Hyperactive behavior    Corticosteroids (glucocorticoids) Anxiety     hyperactivity       Physical Examination     Vitals:    03/25/24 0930   BP: 133/78   Pulse: 72             Spine Musculoskeletal Exam    Inspection    Leg length disparity: no discrepancy    Thoracolumbar    Erythema: none    Edema (right lower extremity): none    Edema (left lower extremity): none    Ecchymosis: none    Deformity: none    General      Constitutional: appears stated age, well-developed and well-nourished    Scleral icterus: no    Labored breathing: no    Psychiatric: normal mood and affect and no acute distress    Neurological: alert and oriented x3    Skin: intact     CV: extremities warm, well-perfused  Resp: nonlabored breathing    Assessment and Plan (including Health Maintenance)      Problem List  Smart Sets  Document Outside HM   :    Plan:   Lumbar spondylosis    Lumbar radiculitis    Chronic back pain greater than 3 months duration       He will continue taking Lyrica and Robaxin as previously prescribed.  I am scheduling him for bilateral L3-5 diagnostic medial branch blocks today.  However, we will not plan to proceed to radiofrequency ablation.  He is unable to receive steroids as well due to difficulty controlling his blood glucose.    Problem List Items Addressed This Visit          Orthopedic Problems    Lumbar spondylosis - Primary       Other    Chronic back pain greater than 3 months duration    Lumbar radiculitis         Future Appointments   Date Time Provider Department Center   4/17/2024  9:00 AM Coco Zuniga, AGACNP-BC [facility] [address]    11/18/2024 10:30 AM Coco Zuniga, AGACNP-BC [facility] [address]        There are no Patient Instructions on file for this visit.  No follow-ups on file.     Signature:  Pauline Lala MD      Date of encounter: 3/25/24

## 2024-03-25 NOTE — LETTER
April 2, 2024       [facility]  [address], [address]  [facility] [address]  Phone: [phone]  Fax: [phone]       Patient: Wen Morris   YOB: 1955  Date of Visit: 04/02/2024    To Whom It May Concern:    Ariela Morris  was at [facility] on 3/25/2024. If you have any questions or concerns, or if I can be of further assistance, please do not hesitate to contact me.    Sincerely,  Pauline Lala MD/mt

## 2024-04-22 ENCOUNTER — OFFICE VISIT (OUTPATIENT)
Dept: NEUROLOGY | Facility: CLINIC | Age: 69
End: 2024-04-22
Payer: OTHER GOVERNMENT

## 2024-04-22 VITALS
WEIGHT: 215 LBS | BODY MASS INDEX: 32.58 KG/M2 | SYSTOLIC BLOOD PRESSURE: 130 MMHG | DIASTOLIC BLOOD PRESSURE: 74 MMHG | HEIGHT: 68 IN

## 2024-04-22 DIAGNOSIS — G43.709 CHRONIC MIGRAINE WITHOUT AURA WITHOUT STATUS MIGRAINOSUS, NOT INTRACTABLE: Primary | Chronic | ICD-10-CM

## 2024-04-22 PROCEDURE — 99999 PR PBB SHADOW E&M-EST. PATIENT-LVL V: CPT | Mod: PBBFAC,,, | Performed by: NURSE PRACTITIONER

## 2024-04-22 PROCEDURE — 99214 OFFICE O/P EST MOD 30 MIN: CPT | Mod: S$PBB,,, | Performed by: NURSE PRACTITIONER

## 2024-04-22 PROCEDURE — 99215 OFFICE O/P EST HI 40 MIN: CPT | Mod: PBBFAC | Performed by: NURSE PRACTITIONER

## 2024-04-22 RX ORDER — METFORMIN HYDROCHLORIDE EXTENDED-RELEASE TABLETS 500 MG/1
500 TABLET, FILM COATED, EXTENDED RELEASE ORAL
COMMUNITY

## 2024-04-22 RX ORDER — FREMANEZUMAB-VFRM 225 MG/1.5ML
225 INJECTION SUBCUTANEOUS
COMMUNITY

## 2024-04-22 NOTE — PATIENT INSTRUCTIONS
"Migraines in Adults   The Basics   Written by the doctors and editors at [facility]   What are migraines? -- Migraines are a kind of headache that can also involve other symptoms. Migraines can affect both adults and children. They are more common in women than in men. Migraines often start off mild and then get worse.  What are the symptoms of migraines in adults? -- Symptoms can include:  Headache - The headache gets worse over several hours and is usually throbbing. It often affects 1 side of the head.  Nausea and sometimes vomiting  Feeling sensitive to light and noise - Lying down in a quiet, dark room often helps.  Aura - Some people have something called a migraine "aura." An aura is a symptom or feeling that happens before or during the migraine headache. Each person's aura is different, but in most cases the aura affects the vision. You might see flashing lights, bright spots, or zig-zag lines, or lose part of your vision. Or you might have numbness and tingling of the lips, lower face, and fingers of 1 hand. Some people hear sounds or have ringing in their ears as part of their aura. The aura usually lasts a few minutes to an hour and then goes away, but most often lasts 15 to 30 minutes.  Women who get migraines with aura usually cannot take birth control pills. That's because they might increase the risk of stroke.  Many people get other symptoms of migraine that happen several hours or even a day before the headache. Doctors call these "premonitory" or "prodromal" symptoms. They might include yawning, feeling depressed, irritability, food cravings, constipation, or a stiff neck.  Is there a test for migraines? -- No. There is no test. But your doctor should be able to tell if you have migraines by doing an exam and learning about your symptoms.  Should I see a doctor or nurse? -- Yes. If you think you are having migraines, you should talk to your doctor or nurse. You should also see a doctor or nurse if " "your migraines get worse or more frequent, or if you have new symptoms.  Is there anything I can do to prevent migraines? -- Yes. Some people find that their migraines are triggered by certain things. If you can avoid some of these things, you can lower your chances of getting migraines.  You can also keep a "headache calendar." In the calendar, write down every time you have a migraine and what you ate and did before it started. That way you can find out if there is anything you should avoid eating or doing. You can also write down what medicine you took and whether or not it helped.  Common migraine triggers include:  Stress  Hormonal changes  Skipping meals or not eating enough  Changes in the weather  Sleeping too much or too little  Bright or flashing lights  Drinking alcohol  Certain drinks or foods, such as red wine, aged cheese, and hot dogs  If your migraines are frequent or severe, your doctor can suggest others ways to help prevent them. For example, it might help to learn relaxation techniques and ways to manage stress. There are also medicines that can help.  Some women get migraines just before or during their period. Medicine can help with this, too.  How are migraines treated? -- There are many different medicines that can help with migraines. Your doctor can help you find the best treatment for your situation.  For mild migraines, your doctor might suggest an over-the-counter medicine such as acetaminophen (sample brand name: Tylenol), ibuprofen (sample brand names: Advil, Motrin), or naproxen (sample brand name: Aleve). There is also a medicine that combines acetaminophen, aspirin, and caffeine (sample brand name: Excedrin).  For more severe migraines, there are prescription medicines that can help. Some, such as medicines called triptans, help to relieve the pain from a migraine attack. Other prescription medicines can help to make migraine attacks happen less often. If you have severe nausea or " vomiting with your migraines, there are medicines that can help with that, too.   Do not try to treat frequent migraines on your own with non-prescription pain medicines. Taking non-prescription pain medicines too often can actually cause more headaches later.  What if I want to get pregnant? -- If you want to get pregnant, talk to your doctor or nurse about it before you start trying. Some medicines used to treat and prevent migraines are not safe during pregnancy, so you might need to switch medicines before you get pregnant.  Some women notice that their migraines actually get better during pregnancy and breastfeeding. This is related to hormonal changes in the body.  All topics are updated as new evidence becomes available and our peer review process is complete.  This topic retrieved from [employer] on: Sep 21, 2021.  Topic 322178 Version 5.0  Release: 29.4.2 - C29.263  © 2021 UpToDate, Inc. and/or its affiliates. All rights reserved.  Consumer Information Use and Disclaimer   This information is not specific medical advice and does not replace information you receive from your health care provider. This is only a brief summary of general information. It does NOT include all information about conditions, illnesses, injuries, tests, procedures, treatments, therapies, discharge instructions or life-style choices that may apply to you. You must talk with your health care provider for complete information about your health and treatment options. This information should not be used to decide whether or not to accept your health care provider's advice, instructions or recommendations. Only your health care provider has the knowledge and training to provide advice that is right for you. The use of this information is governed by the "[employer] End User License Agreement, available at [email or web address]/[email or web address].The use of [employer] content is governed by the [employer] Terms of Use. ©2021  [employer] All rights reserved.  Copyright   © 2021 [employer] and/or its affiliates. All rights reserved.

## 2024-04-22 NOTE — PROGRESS NOTES
Subjective:       Patient ID: Wen Morris is a 68 y.o. male.    Chief Complaint: Migraine    Migraine     Now on Ajovy 225/mo due to increased freq & duration of migraines at his last visit  He stopped nurtec  ([facility] would not cover aimovig...ajovy is their preferred now)    Went to [facility] in [place]  Had a couple sips of beer and then he doesn't remember anything after that; next thing he remembers is waking up in his bed  His sister & brother were with him; just stated that he was acting strange (crazy/goofy); they ended up going to [facility] after they left [facility].  He doesn't remember spending $100 there.  They also stated that he urinated on himself.  No report of sz activity.  No med changes before that.  He was put on metformin XR a few days after.  He was on regular release before that but it made him feel lethargic and he did not feel comfortable driving while on it.    Dr. Calderon ordered a CT as he thought he had a stroke.  He has not followed up with dr. Calderon for the results.     Prev tried: gabapentin, fluoxetine, coreg, lyrica, flexeril, robaxin, nurtec  ONB have prev not provided relief  Tried steroids - didn't work, plus he's diabetic  H/o CAD & reported cva - triptans contraindicated    Review of Systems   A 14pt ROS was reviewed & is negative unless otherwise documented in the HPI  Objective:      Physical Exam    GENERAL: NAD, calm, cooperative, appropriate, smiling today  Awake/alert  Well groomed  RESP: CTAB  HEART: RRR  No LE edema  MENTAL STATUS: oriented, follow commands reliably  SPEECH/LANGUAGE: clear, fluent  CN:  Perrla, eomi, vff, gaze conjugate  No tactile or motor facial asymmetry  Tongue protrudes midline  Motor: no focal weakness  Cerebellar: no tremor or dysmetria  Sensory: normal to tactile stim/vibration  DTRs: normal +2, symmetric  Gait: steady    Problem List Items Addressed This Visit          Neuro    Chronic migraine without aura without status migrainosus, not  intractable - Primary (Chronic)         PLAN:  Cont Ajovy 225mg/mo  Attempted to look for report of the CTH in the system & I do not see it.  He is to f/u w/ Dr. Calderon to discuss imaging  F/u 6mo    Coco Zuniga, [facility]

## 2024-04-24 ENCOUNTER — OFFICE VISIT (OUTPATIENT)
Dept: PAIN MEDICINE | Facility: CLINIC | Age: 69
End: 2024-04-24
Payer: OTHER GOVERNMENT

## 2024-04-24 VITALS
SYSTOLIC BLOOD PRESSURE: 145 MMHG | TEMPERATURE: 98 F | BODY MASS INDEX: 32.58 KG/M2 | DIASTOLIC BLOOD PRESSURE: 87 MMHG | HEIGHT: 68 IN | WEIGHT: 215 LBS | HEART RATE: 78 BPM

## 2024-04-24 DIAGNOSIS — M54.16 LUMBAR RADICULITIS: ICD-10-CM

## 2024-04-24 DIAGNOSIS — M51.36 DDD (DEGENERATIVE DISC DISEASE), LUMBAR: ICD-10-CM

## 2024-04-24 DIAGNOSIS — M47.816 LUMBAR SPONDYLOSIS: Primary | ICD-10-CM

## 2024-04-24 PROCEDURE — 99213 OFFICE O/P EST LOW 20 MIN: CPT | Mod: ,,, | Performed by: NURSE PRACTITIONER

## 2024-04-24 RX ORDER — ASCORBIC ACID 500 MG
1 TABLET ORAL EVERY MORNING
COMMUNITY

## 2024-04-24 RX ORDER — ENTECAVIR 1 MG/1
1 TABLET, FILM COATED ORAL
COMMUNITY
Start: 2023-11-03

## 2024-04-24 RX ORDER — LORATADINE 10 MG/1
10 TABLET ORAL
COMMUNITY
Start: 2024-03-18

## 2024-04-24 NOTE — PROGRESS NOTES
ADMISSION HISTORY & PHYSICAL    SUBJECTIVE:    CHIEF COMPLAINT: Pre-op Exam (Pre op procedure 5/8/24 C/O pain level 4, not taking pain meds.)       History of Present Illness: 68 y.o. male presents today for preoperative evaluation for diagnostic bilateral medial branch blocks L3 -5 on 0 5/08/2024. I reviewed the indications for procedure. The risks and benefits of the proposed and alternative treatments were discussed with the patient. Questions pertinent to the procedure were solicited and answered. No assurances were given. Informed consent was obtained. The patient expressed good understanding and wished to proceed with scheduling the procedure.     Review of Systems:   Constitutional: No fever, weakness, or fatigue.   Ear/Nose/Mouth/Throat: No nasal congestion or sore throat.   Respiratory: No shortness of breath or cough.   Cardiovascular: No chest pain, palpitations, or peripheral edema.   Gastrointestinal: No nausea, vomiting, or abdominal pain.   Genitourinary: No dysuria.  Musculoskeletal:  Dr. Lala noted she was  scheduling him for bilateral L3-5 diagnostic medial branch blocks; However, we will not plan to proceed to radiofrequency ablation. He is unable to receive steroids as well due to difficulty controlling his blood glucose.     Past Surgical History:   Procedure Laterality Date    CARDIAC DEFIBRILLATOR PLACEMENT  2023    Dr. Nikolas Varela    CHOLECYSTECTOMY      COLONOSCOPY      EYE SURGERY      INJECTION OF ANESTHETIC AGENT AROUND MEDIAL BRANCH NERVES INNERVATING LUMBAR FACET JOINT Bilateral 2/28/2024    Procedure: Block-nerve-medial branch-lumbar;  Surgeon: Pauline Lala MD;  Location: [facility];  Service: Pain Management;  Laterality: Bilateral;  MBB Malcom L3-5  contact [employer] [phone] for magnet placement    NECK SURGERY      TONSILLECTOMY          Past Medical History:   Diagnosis Date    Arthritis     Bell palsy     Chronic neck pain     DM (diabetes mellitus)      Glaucoma     Headache     Hepatitis B     HTN (hypertension)     Memory loss     Neuralgia     Sleep apnea     does not use machine    Unspecified viral hepatitis B without hepatic coma         OBJECTIVE:    Vitals:    04/24/24 0851   BP: (!) 145/87   Pulse: 78   Temp: 98 °F (36.7 °C)      Pain Disability Index  Family/Home Responsibilities:: 4  Recreation:: 4  Social Activity:: 4  Occupation:: 4  Sexual Behavior:: 0  Self Care:: 3  Life-Support Activities:: 3  Pain Disability Index (PDI): 22      Physical Exam:   General: Well-developed, well-nourished.  Neuro: Alert and oriented x 3.  Psych: Normal mood and affect.  HEENT: Normocephalic. PERRLA EOM intact. Nose and throat clear.  Lungs: Clear to auscultation and percussion.  Heart: Regular rate and rhythm   Abdomen: Soft non-tender. Bowel sounds positive. No rebound tenderness.  Skin: No rashes or open wounds  Musculoskeletal:  Antalgic gait.  Independent ambulator.  Bilateral upper and lower extremity strength 5/5.  No sensory deficits to upper or lower extremities.  Negative straight leg raise.  Limited range of motion with lumbar extension and lateral rotation.    ASSESSMENT:  1. Lumbar spondylosis    2. Lumbar radiculitis    3. DDD (degenerative disc disease), lumbar       PLAN:  Plan for to proceed with second diagnostic bilateral medial branch blocks L3 -5 on 05/08/2024.. The patient has been given preoperative patient was instructed to hold metformin 3 days prior to procedure and resume postop as he will receive contrast.  Additionally he was asked to hold 81 mg aspirin Toradol 7 days prior to procedure and resume postop..  Written instructions were given to the patient upon discharge due to memory deficits.  instructions and prescriptions for post-operative medication. Post-operative appointment is scheduled for 2 weeks.Pt will hold Metformin 3 days prior to procedure and resume post op. Patient to hold MVIs and fish oil 7 days prior to procedure and  resume post op. No RFA will be scheduled as he has a an implanted debirillator.

## 2024-04-24 NOTE — H&P (VIEW-ONLY)
ADMISSION HISTORY & PHYSICAL    SUBJECTIVE:    CHIEF COMPLAINT: Pre-op Exam (Pre op procedure 5/8/24 C/O pain level 4, not taking pain meds.)       History of Present Illness: 68 y.o. male presents today for preoperative evaluation for diagnostic bilateral medial branch blocks L3 -5 on 0 5/08/2024. I reviewed the indications for procedure. The risks and benefits of the proposed and alternative treatments were discussed with the patient. Questions pertinent to the procedure were solicited and answered. No assurances were given. Informed consent was obtained. The patient expressed good understanding and wished to proceed with scheduling the procedure.     Review of Systems:   Constitutional: No fever, weakness, or fatigue.   Ear/Nose/Mouth/Throat: No nasal congestion or sore throat.   Respiratory: No shortness of breath or cough.   Cardiovascular: No chest pain, palpitations, or peripheral edema.   Gastrointestinal: No nausea, vomiting, or abdominal pain.   Genitourinary: No dysuria.  Musculoskeletal:  Dr. Lala noted she was  scheduling him for bilateral L3-5 diagnostic medial branch blocks; However, we will not plan to proceed to radiofrequency ablation. He is unable to receive steroids as well due to difficulty controlling his blood glucose.     Past Surgical History:   Procedure Laterality Date    CARDIAC DEFIBRILLATOR PLACEMENT  2023    Dr. Nikolas Varela    CHOLECYSTECTOMY      COLONOSCOPY      EYE SURGERY      INJECTION OF ANESTHETIC AGENT AROUND MEDIAL BRANCH NERVES INNERVATING LUMBAR FACET JOINT Bilateral 2/28/2024    Procedure: Block-nerve-medial branch-lumbar;  Surgeon: Pauline Lala MD;  Location: [facility];  Service: Pain Management;  Laterality: Bilateral;  MBB Malcom L3-5  contact [employer] [phone] for magnet placement    NECK SURGERY      TONSILLECTOMY          Past Medical History:   Diagnosis Date    Arthritis     Bell palsy     Chronic neck pain     DM (diabetes mellitus)      Glaucoma     Headache     Hepatitis B     HTN (hypertension)     Memory loss     Neuralgia     Sleep apnea     does not use machine    Unspecified viral hepatitis B without hepatic coma         OBJECTIVE:    Vitals:    04/24/24 0851   BP: (!) 145/87   Pulse: 78   Temp: 98 °F (36.7 °C)      Pain Disability Index  Family/Home Responsibilities:: 4  Recreation:: 4  Social Activity:: 4  Occupation:: 4  Sexual Behavior:: 0  Self Care:: 3  Life-Support Activities:: 3  Pain Disability Index (PDI): 22      Physical Exam:   General: Well-developed, well-nourished.  Neuro: Alert and oriented x 3.  Psych: Normal mood and affect.  HEENT: Normocephalic. PERRLA EOM intact. Nose and throat clear.  Lungs: Clear to auscultation and percussion.  Heart: Regular rate and rhythm   Abdomen: Soft non-tender. Bowel sounds positive. No rebound tenderness.  Skin: No rashes or open wounds  Musculoskeletal:  Antalgic gait.  Independent ambulator.  Bilateral upper and lower extremity strength 5/5.  No sensory deficits to upper or lower extremities.  Negative straight leg raise.  Limited range of motion with lumbar extension and lateral rotation.    ASSESSMENT:  1. Lumbar spondylosis    2. Lumbar radiculitis    3. DDD (degenerative disc disease), lumbar       PLAN:  Plan for to proceed with second diagnostic bilateral medial branch blocks L3 -5 on 05/08/2024.. The patient has been given preoperative patient was instructed to hold metformin 3 days prior to procedure and resume postop as he will receive contrast.  Additionally he was asked to hold 81 mg aspirin Toradol 7 days prior to procedure and resume postop..  Written instructions were given to the patient upon discharge due to memory deficits.  instructions and prescriptions for post-operative medication. Post-operative appointment is scheduled for 2 weeks.Pt will hold Metformin 3 days prior to procedure and resume post op. Patient to hold MVIs and fish oil 7 days prior to procedure and  resume post op. No RFA will be scheduled as he has a an implanted debirillator.

## 2024-04-24 NOTE — LETTER
April 24, 2024       [facility]  [address], [address]  [place] [address]  Phone: [phone]  Fax: [phone]       Patient: Wen Morris   YOB: 1955  Date of Visit: 04/24/2024    To Whom It May Concern:    Ariela Morris  was at [facility] on 04/24/2024.  If you have any questions or concerns, or if I can be of further assistance, please do not hesitate to contact me.    Sincerely,    Alcides Lilly NP

## 2024-04-24 NOTE — PATIENT INSTRUCTIONS
Patient to hold Multivitamins  and fish oil 7 days prior to procedure and resume post op.  Hold Metformin 3 days prior to procedure and resume post op.     Your procedure will be located in the [facility] in the surgery suite. Check in at registration (front entrance of hospital ). You will need a  to take you and bring you back.

## 2024-04-30 ENCOUNTER — ANESTHESIA EVENT (OUTPATIENT)
Dept: SURGERY | Facility: HOSPITAL | Age: 69
End: 2024-04-30
Payer: OTHER GOVERNMENT

## 2024-05-08 ENCOUNTER — ANESTHESIA (OUTPATIENT)
Dept: SURGERY | Facility: HOSPITAL | Age: 69
End: 2024-05-08
Payer: OTHER GOVERNMENT

## 2024-05-08 ENCOUNTER — HOSPITAL ENCOUNTER (OUTPATIENT)
Facility: HOSPITAL | Age: 69
Discharge: HOME | End: 2024-05-08
Attending: ANESTHESIOLOGY | Admitting: ANESTHESIOLOGY
Payer: OTHER GOVERNMENT

## 2024-05-08 DIAGNOSIS — G89.29 CHRONIC BACK PAIN GREATER THAN 3 MONTHS DURATION: Primary | ICD-10-CM

## 2024-05-08 DIAGNOSIS — M54.9 CHRONIC BACK PAIN GREATER THAN 3 MONTHS DURATION: Primary | ICD-10-CM

## 2024-05-08 LAB — POCT GLUCOSE: 122 MG/DL (ref 70–110)

## 2024-05-08 PROCEDURE — 64494 INJ PARAVERT F JNT L/S 2 LEV: CPT | Mod: 50,,, | Performed by: ANESTHESIOLOGY

## 2024-05-08 PROCEDURE — 63600175 PHARM REV CODE 636 W HCPCS: Mod: JZ,JG | Performed by: ANESTHESIOLOGY

## 2024-05-08 PROCEDURE — 37000009 HC ANESTHESIA EA ADD 15 MINS: Performed by: ANESTHESIOLOGY

## 2024-05-08 PROCEDURE — 64493 INJ PARAVERT F JNT L/S 1 LEV: CPT | Mod: 50 | Performed by: ANESTHESIOLOGY

## 2024-05-08 PROCEDURE — D9220A PRA ANESTHESIA: Mod: ANES,,, | Performed by: ANESTHESIOLOGY

## 2024-05-08 PROCEDURE — 64493 INJ PARAVERT F JNT L/S 1 LEV: CPT | Mod: 50,,, | Performed by: ANESTHESIOLOGY

## 2024-05-08 PROCEDURE — 82962 GLUCOSE BLOOD TEST: CPT | Performed by: ANESTHESIOLOGY

## 2024-05-08 PROCEDURE — 25000003 PHARM REV CODE 250

## 2024-05-08 PROCEDURE — 25000003 PHARM REV CODE 250: Performed by: ANESTHESIOLOGY

## 2024-05-08 PROCEDURE — 64494 INJ PARAVERT F JNT L/S 2 LEV: CPT | Mod: 50 | Performed by: ANESTHESIOLOGY

## 2024-05-08 PROCEDURE — 63600175 PHARM REV CODE 636 W HCPCS

## 2024-05-08 PROCEDURE — 37000008 HC ANESTHESIA 1ST 15 MINUTES: Performed by: ANESTHESIOLOGY

## 2024-05-08 PROCEDURE — D9220A PRA ANESTHESIA: Mod: CRNA,,,

## 2024-05-08 RX ORDER — LIDOCAINE HYDROCHLORIDE 20 MG/ML
INJECTION INTRAVENOUS
Status: DISCONTINUED | OUTPATIENT
Start: 2024-05-08 | End: 2024-05-08

## 2024-05-08 RX ORDER — SODIUM CHLORIDE, SODIUM GLUCONATE, SODIUM ACETATE, POTASSIUM CHLORIDE AND MAGNESIUM CHLORIDE 30; 37; 368; 526; 502 MG/100ML; MG/100ML; MG/100ML; MG/100ML; MG/100ML
INJECTION, SOLUTION INTRAVENOUS CONTINUOUS
Status: CANCELLED | OUTPATIENT
Start: 2024-05-08 | End: 2024-06-07

## 2024-05-08 RX ORDER — SODIUM CHLORIDE, SODIUM GLUCONATE, SODIUM ACETATE, POTASSIUM CHLORIDE AND MAGNESIUM CHLORIDE 30; 37; 368; 526; 502 MG/100ML; MG/100ML; MG/100ML; MG/100ML; MG/100ML
INJECTION, SOLUTION INTRAVENOUS CONTINUOUS
Status: ACTIVE | OUTPATIENT
Start: 2024-05-08 | End: 2024-06-07

## 2024-05-08 RX ORDER — LIDOCAINE HYDROCHLORIDE 10 MG/ML
INJECTION, SOLUTION EPIDURAL; INFILTRATION; INTRACAUDAL; PERINEURAL
Status: DISCONTINUED | OUTPATIENT
Start: 2024-05-08 | End: 2024-05-08 | Stop reason: HOSPADM

## 2024-05-08 RX ORDER — PROPOFOL 10 MG/ML
VIAL (ML) INTRAVENOUS
Status: DISCONTINUED | OUTPATIENT
Start: 2024-05-08 | End: 2024-05-08

## 2024-05-08 RX ORDER — SODIUM CHLORIDE, SODIUM LACTATE, POTASSIUM CHLORIDE, CALCIUM CHLORIDE 600; 310; 30; 20 MG/100ML; MG/100ML; MG/100ML; MG/100ML
INJECTION, SOLUTION INTRAVENOUS CONTINUOUS
Status: ACTIVE | OUTPATIENT
Start: 2024-05-08

## 2024-05-08 RX ORDER — ONDANSETRON HYDROCHLORIDE 2 MG/ML
4 INJECTION, SOLUTION INTRAVENOUS DAILY PRN
Status: CANCELLED | OUTPATIENT
Start: 2024-05-08

## 2024-05-08 RX ORDER — BUPIVACAINE HYDROCHLORIDE 2.5 MG/ML
INJECTION, SOLUTION EPIDURAL; INFILTRATION; INTRACAUDAL
Status: DISCONTINUED
Start: 2024-05-08 | End: 2024-05-08 | Stop reason: HOSPADM

## 2024-05-08 RX ORDER — DEXMEDETOMIDINE HYDROCHLORIDE 100 UG/ML
INJECTION, SOLUTION INTRAVENOUS
Status: DISCONTINUED | OUTPATIENT
Start: 2024-05-08 | End: 2024-05-08

## 2024-05-08 RX ORDER — LIDOCAINE HYDROCHLORIDE 10 MG/ML
INJECTION, SOLUTION EPIDURAL; INFILTRATION; INTRACAUDAL; PERINEURAL
Status: DISCONTINUED
Start: 2024-05-08 | End: 2024-05-08 | Stop reason: HOSPADM

## 2024-05-08 RX ORDER — TRIAMCINOLONE ACETONIDE 40 MG/ML
INJECTION, SUSPENSION INTRA-ARTICULAR; INTRAMUSCULAR
Status: DISCONTINUED
Start: 2024-05-08 | End: 2024-05-08 | Stop reason: WASHOUT

## 2024-05-08 RX ORDER — BUPIVACAINE HYDROCHLORIDE 2.5 MG/ML
INJECTION, SOLUTION EPIDURAL; INFILTRATION; INTRACAUDAL
Status: DISCONTINUED | OUTPATIENT
Start: 2024-05-08 | End: 2024-05-08 | Stop reason: HOSPADM

## 2024-05-08 RX ADMIN — SODIUM CHLORIDE, SODIUM GLUCONATE, SODIUM ACETATE, POTASSIUM CHLORIDE AND MAGNESIUM CHLORIDE: 526; 502; 368; 37; 30 INJECTION, SOLUTION INTRAVENOUS at 11:05

## 2024-05-08 RX ADMIN — DEXMEDETOMIDINE HYDROCHLORIDE 8 MCG: 100 INJECTION, SOLUTION INTRAVENOUS at 11:05

## 2024-05-08 RX ADMIN — PROPOFOL 100 MG: 10 INJECTION, EMULSION INTRAVENOUS at 11:05

## 2024-05-08 RX ADMIN — LIDOCAINE HYDROCHLORIDE 50 MG: 20 INJECTION INTRAVENOUS at 11:05

## 2024-05-08 NOTE — ANESTHESIA POSTPROCEDURE EVALUATION
Anesthesia Post Evaluation    Patient: Wen Morris    Procedure(s) Performed: Procedure(s) (LRB):  Block-nerve-medial branch-lumbar (Bilateral)    Final Anesthesia Type: general      Patient location during evaluation: PACU  Patient participation: Yes- Able to Participate  Level of consciousness: awake and alert  Post-procedure vital signs: reviewed and stable  Pain management: adequate  Airway patency: patent    PONV status at discharge: No PONV  Anesthetic complications: no      Cardiovascular status: hemodynamically stable  Respiratory status: unassisted  Hydration status: euvolemic  Follow-up not needed.              Vitals Value Taken Time   /79 05/08/24 1157   Temp  05/08/24 1159   Pulse 77 05/08/24 1158   Resp 16 05/08/24 1159   SpO2 90 % 05/08/24 1158   Vitals shown include unfiled device data.      No case tracking events are documented in the log.      Pain/Heather Score: Heather Score: 10 (5/8/2024 11:29 AM)

## 2024-05-08 NOTE — ANESTHESIA PREPROCEDURE EVALUATION
05/08/2024  Wen Morris is a 69 y.o., male with   -------------------------------------    Arthritis    Bell palsy    Chronic neck pain    DM (diabetes mellitus)    Glaucoma    Headache    Hepatitis B    HTN (hypertension)    Memory loss    Neuralgia    Sleep apnea    does not use machine    Unspecified viral hepatitis B without hepatic coma       And   ----------------------------    Cardiac defibrillator placement    Dr. Nikolas Varela    Cholecystectomy    Colonoscopy    Eye surgery    Injection of anesthetic agent around medial branch nerves innervating lumbar facet joint    Procedure: Block-nerve-medial branch-lumbar;  Surgeon: Pauline Lala MD;  Location: [facility];  Service: Pain Management;  Laterality: Bilateral;  MBB Malcom L3-5  contact [employer] [facility] [phone] for magnet placement    Neck surgery    Tonsillectomy       Presents for bilateral Medial branch block for chronic back pain       Pre-op Assessment    I have reviewed the NPO Status.      Review of Systems  Cardiovascular:     Hypertension                                  Hypertension         Pulmonary:        Sleep Apnea     Obstructive Sleep Apnea (BRIAN).           Hepatic/GI:      Liver Disease, Hepatitis        Liver Disease, Hepatitis        Musculoskeletal:  Arthritis        Arthritis          Neurological:    Neuromuscular Disease,  Headaches      Dx of Headaches   Arthritis                         Neuromuscular Disease   Endocrine:  Diabetes    Diabetes                          Physical Exam  General: Well nourished, Cooperative, Alert and Oriented    Airway:  Mallampati: II   Mouth Opening: Normal  TM Distance: Normal  Tongue: Normal  Neck ROM: Normal ROM    Dental:  Edentulous    Chest/Lungs:  Clear to auscultation, Normal Respiratory Rate    Heart:  Rate: Normal  Rhythm: Regular Rhythm        Anesthesia Plan  Type of  Anesthesia, risks & benefits discussed:    Anesthesia Type: Gen Natural Airway  Intra-op Monitoring Plan: Standard ASA Monitors  Post Op Pain Control Plan: IV/PO Opioids PRN  Induction:  IV  Airway Plan: Direct  Informed Consent: Informed consent signed with the Patient and all parties understand the risks and agree with anesthesia plan.  All questions answered. Patient consented to blood products? No  ASA Score: 3  Day of Surgery Review of History & Physical: H&P Update referred to the surgeon/provider.  Anesthesia Plan Notes: Nasal cannula vs facemask supplemental oxygenation   For patients with BRIAN/obesity, may consider SuperNoval Nasal CPAP      Ready For Surgery From Anesthesia Perspective.     .

## 2024-05-08 NOTE — TRANSFER OF CARE
"Anesthesia Transfer of Care Note    Patient: Wen Morris    Procedure(s) Performed: Procedure(s) (LRB):  Block-nerve-medial branch-lumbar (Bilateral)    Patient location: OPS    Anesthesia Type: general    Transport from OR: Transported from OR on room air with adequate spontaneous ventilation    Post pain: adequate analgesia    Post assessment: no apparent anesthetic complications and tolerated procedure well    Post vital signs: stable    Level of consciousness: awake, alert and oriented    Nausea/Vomiting: no nausea/vomiting    Complications: none    Transfer of care protocol was followed      Last vitals: Visit Vitals  BP (!) 151/81   Pulse 77   Temp 36.4 °C (97.6 °F) (Tympanic)   Resp 20   Ht 5' 8" (1.727 m)   Wt 96.8 kg (213 lb 6.5 oz)   SpO2 (!) 93%   BMI 32.45 kg/m²     "

## 2024-05-08 NOTE — ADDENDUM NOTE
Problem: At Risk for Falls  Goal: Patient does not fall  Outcome: Monitoring/Evaluating progress     Problem: At Risk for Falls  Goal: Patient takes action to control fall-related risks  Outcome: Monitoring/Evaluating progress     Problem: Impaired Physical Mobility  Goal: Functional status is maintained or returned to baseline during hospitalization  Outcome: Monitoring/Evaluating progress     Problem: Skin Integrity Alteration  Goal: Skin remains intact with no new/deterioration of wound or pressure injury  Outcome: Monitoring/Evaluating progress     Problem: Pain  Goal: Acceptable pain level achieved/maintained with activity using appropriate pain scale for the patient  Outcome: Monitoring/Evaluating progress      Addendum  created 05/08/24 1207 by Remberto Borja CRNA    Clinical Note Signed

## 2024-05-08 NOTE — DISCHARGE SUMMARY
[facility] - Periop Services  Discharge Note  Short Stay    Procedure(s) (LRB):  Block-nerve-medial branch-lumbar (Bilateral)      OUTCOME: Patient tolerated treatment/procedure well without complication and is now ready for discharge.    DISPOSITION: Home or Self Care    FINAL DIAGNOSIS:  <principal problem not specified>    FOLLOWUP: In clinic    DISCHARGE INSTRUCTIONS:  No discharge procedures on file.     TIME SPENT ON DISCHARGE: 5 minutes

## 2024-05-09 VITALS
OXYGEN SATURATION: 95 % | SYSTOLIC BLOOD PRESSURE: 156 MMHG | DIASTOLIC BLOOD PRESSURE: 97 MMHG | RESPIRATION RATE: 20 BRPM | BODY MASS INDEX: 32.34 KG/M2 | TEMPERATURE: 98 F | HEART RATE: 72 BPM | HEIGHT: 68 IN | WEIGHT: 213.38 LBS

## 2024-05-22 ENCOUNTER — OFFICE VISIT (OUTPATIENT)
Dept: PAIN MEDICINE | Facility: CLINIC | Age: 69
End: 2024-05-22
Payer: OTHER GOVERNMENT

## 2024-05-22 VITALS
HEART RATE: 75 BPM | BODY MASS INDEX: 32.28 KG/M2 | WEIGHT: 213 LBS | HEIGHT: 68 IN | SYSTOLIC BLOOD PRESSURE: 121 MMHG | DIASTOLIC BLOOD PRESSURE: 68 MMHG

## 2024-05-22 DIAGNOSIS — M54.16 LUMBAR RADICULITIS: ICD-10-CM

## 2024-05-22 DIAGNOSIS — M51.36 DDD (DEGENERATIVE DISC DISEASE), LUMBAR: ICD-10-CM

## 2024-05-22 DIAGNOSIS — M47.816 LUMBAR SPONDYLOSIS: Primary | ICD-10-CM

## 2024-05-22 PROCEDURE — 99214 OFFICE O/P EST MOD 30 MIN: CPT | Mod: ,,, | Performed by: NURSE PRACTITIONER

## 2024-05-22 RX ORDER — MELOXICAM 15 MG/1
15 TABLET ORAL
COMMUNITY
Start: 2024-03-18

## 2024-05-22 NOTE — PROGRESS NOTES
"Subjective:      Patient ID: Wen Morris is a 69 y.o. male.    Chief Complaint: Follow-up (F/u 2nd MBB L3-L5 5/8/24 C/O pain level 2, states procedure helped pt needs to be scheduled for 1st RFA, not taking pain meds.)    Referred by: Mindy Mcfarland He*     HPI this is a pleasant 69-year-old male  presenting as a postoperative evaluation for pain associated with lumbar spondylosis after receiving a diagnostic bilateral medial branch blocks L3-L5 on 05/08/2024 this was her 2nd diagnostic bilateral medial branch block to L3-L5.  Her 1st 1 was completed on 02/28/2024.  [place] reported he received greater than 80% relief of lumbar axial spine pain on 2 separate occasions after completing these diagnostic medial branch blocks.  After these blocks he was able to complete more chores especially yd work and noticed that his sleep was much improved the highest his pain score would elevate to during that timeframe he was evaluating the effectiveness would be up to a 4/10.  Now his pain has returning and he would like to move forward with a radiofrequency ablation on the left L3-L5.  Patient also has a cardiac defibrillator and will stop this with his magnet prior to the procedure.      Vital signs:   Vitals:    05/22/24 0844 05/22/24 0845   BP: 121/68    Pulse: 75    Weight: 96.6 kg (213 lb)    Height: 5' 8" (1.727 m)    PainSc:    2     Body mass index is 32.39 kg/m².  Pain Disability Index (PDI): 10       Interventional Pain History  05/08/2024:  Bilateral MBB L3-5    ROS: Low back pain     MRI lumbar spine   [facility]         Objective:          Physical Exam    General: Well developed; overweight; A&O x 3; No anxiety/depression; NAD  Mental Status: Oriented to person, palce and time. Displays appropriate mood & affect.  Head: Norm cephalic and atraumatic  Neck:  No cervical paraspinal banding.  Full range of motion with lateral turning and cervical flexion " +extension.  Eyes: normal conjunctiva, normal lids, normal pupils  ENT and mouth: normal external ear, nose, and no lesions noted on the lips.  Respiratory: Symmetrical, Unlabored. No dyspnea  CV: normal rhythm and rate. No peripheral edema.   Abdomen: Non-distended    Extremities:  Gen: No cyanosis or tenderness to palpation bilateral upper and lower extremities  Skin: Warm, pink, dry, no rashes, no lesions on the lumbar spine  Strength: 5/5 motor strength bilateral upper and lower extremities  ROM: Full ROM in bilateral knees and ankles without pain or instability.    Neuro:  Gait: no altalgic lean, normal toe and heel raise. Independent ambulator.  DTR's: 2+ in bilateral patellar, and ankle  Sensory: Intact to light touch bilateral  upper and lower extremities    Spine: Normal lordosis. No scoliosis  L-spine ROM: Full ROM to flexion, extension, bilateral rotation,   Straight Leg Raise:  negative bilaterally  SI Joint: No tenderness to palpation bilaterally.        Assessment:     This is a pleasant 69-year-old male  presenting as a postoperative evaluation for pain associated with lumbar spondylosis after receiving a diagnostic bilateral medial branch blocks L3-L5 on 05/08/2024 this was her 2nd diagnostic bilateral medial branch block to L3-L5.  Her 1st 1 was completed on 02/28/2024.  [place] reported he received greater than 80% relief of lumbar axial spine pain on 2 separate occasions after completing these diagnostic medial branch blocks.  After these blocks he was able to complete more chores especially yd work and noticed that his sleep was much improved the highest his pain score would elevate to during that timeframe he was evaluating the effectiveness would be up to a 4/10.  Now his pain has returning and he would like to move forward with a radiofrequency ablation on the left L3-L5.  Patient also has a cardiac defibrillator and will stop this with his magnet prior to the procedure.      Plan of  care:  Received > 80% excellent pain relief to bilateral lumbar axial spine after completing 2 separate bilateral MBB L3-L5 bilaterally. He would like to proceed w/ a left RFA L3-L5 first. He will deactivate his cardiac defibrillator day of procedure and follow up post op  Hold Jardiance 1 day prior and resume post op  Hold Metformin 7 days prior and resume post op  Hold MVI  7 days prior and resume post op  Encounter Diagnoses   Name Primary?    Lumbar spondylosis Yes    Lumbar radiculitis     DDD (degenerative disc disease), lumbar          Plan:       Wen was seen today for follow-up.    Diagnoses and all orders for this visit:    Lumbar spondylosis  -     Case Request Operating Room: Radiofrequency Ablation    Lumbar radiculitis  -     Case Request Operating Room: Radiofrequency Ablation    DDD (degenerative disc disease), lumbar  -     Case Request Operating Room: Radiofrequency Ablation               Past Medical History:   Diagnosis Date    Arthritis     Bell palsy     Chronic neck pain     DM (diabetes mellitus)     Glaucoma     Headache     Hepatitis B     HTN (hypertension)     Memory loss     Neuralgia     Sleep apnea     does not use machine    Unspecified viral hepatitis B without hepatic coma        Past Surgical History:   Procedure Laterality Date    CARDIAC DEFIBRILLATOR PLACEMENT  2023    Dr. Nikolas Varela    CHOLECYSTECTOMY      COLONOSCOPY      EYE SURGERY      INJECTION OF ANESTHETIC AGENT AROUND MEDIAL BRANCH NERVES INNERVATING LUMBAR FACET JOINT Bilateral 2/28/2024    Procedure: Block-nerve-medial branch-lumbar;  Surgeon: Pauline Lala MD;  Location: [facility] OR;  Service: Pain Management;  Laterality: Bilateral;  MBB Malcom L3-5  contact [employer] [place] [phone] for magnet placement    INJECTION OF ANESTHETIC AGENT AROUND MEDIAL BRANCH NERVES INNERVATING LUMBAR FACET JOINT Bilateral 5/8/2024    Procedure: Block-nerve-medial branch-lumbar;  Surgeon: Pauline Lala MD;   Location: [facility] OR;  Service: Pain Management;  Laterality: Bilateral;  MBB Malcom L3-5    NECK SURGERY      TONSILLECTOMY         Family History   Problem Relation Name Age of Onset    Diabetes Mother      Cancer Father         Social History     Socioeconomic History    Marital status:    Tobacco Use    Smoking status: Former     Current packs/day: 0.00     Types: Cigarettes     Quit date:      Years since quittin.3    Smokeless tobacco: Never   Substance and Sexual Activity    Alcohol use: Yes     Alcohol/week: 3.0 - 4.0 standard drinks of alcohol     Types: 3 - 4 Cans of beer per week    Drug use: Never    Sexual activity: Yes       Current Outpatient Medications   Medication Sig Dispense Refill    ascorbic acid, vitamin C, (VITAMIN C) 1000 MG tablet Take 1,000 mg by mouth once daily.      ascorbic acid, vitamin C, (VITAMIN C) 500 MG tablet Take 1 tablet by mouth every morning.      aspirin (ECOTRIN) 81 MG EC tablet Take 1 tablet by mouth every morning.      capsicum 0.075% (ZOSTRIX) 0.075 % topical cream Apply 1 application topically 2 (two) times a day. Apply with gloves      carvediloL (COREG) 25 MG tablet Take 12.5 mg by mouth.      cetirizine (ZYRTEC) 10 MG tablet Take 10 mg by mouth.      chlorpheniramine (CHLOR-TRIMETON) 4 mg tablet Take 4 mg by mouth every 6 (six) hours as needed for Allergies.      cholecalciferol, vitamin D3, 125 mcg (5,000 unit) capsule Take 1 capsule by mouth once daily.      cyclobenzaprine (FLEXERIL) 10 MG tablet Take 10 mg by mouth 3 (three) times daily as needed for Muscle spasms.      donepeziL (ARICEPT) 10 MG tablet Take 10 mg by mouth every evening.      empagliflozin (JARDIANCE) 25 mg tablet Take 25 mg by mouth once daily.      entecavir (BARACLUDE) 1 MG Tab 1 mg.      FLUoxetine 20 MG capsule Take 20 mg by mouth once daily.      fluticasone propionate (FLONASE) 50 mcg/actuation nasal spray by Each Nostril route.      fremanezumab-vfrm (AJOVY AUTOINJECTOR) 225  mg/1.5 mL autoinjector Inject 225 mg into the skin every 30 days.      furosemide (LASIX) 20 MG tablet Take 20 mg by mouth once daily.      glipiZIDE (GLUCOTROL) 10 MG tablet Take 10 mg by mouth 2 (two) times daily before meals.      latanoprost 0.005 % ophthalmic solution Place 1 drop into both eyes every evening.      lisinopriL 10 MG tablet Take 10 mg by mouth 2 (two) times a day.      loratadine (CLARITIN) 10 mg tablet 10 mg.      meloxicam (MOBIC) 15 MG tablet 15 mg.      memantine (NAMENDA) 10 MG Tab Take 5 mg by mouth 2 (two) times daily.      metFORMIN (FORTAMET) 500 mg 24hr tablet Take 500 mg by mouth daily with breakfast.      multivit-min/ferrous fumarate (MULTI VITAMIN ORAL) Take 1 tablet by mouth Daily.      omeprazole (PRILOSEC) 20 MG capsule Take 20 mg by mouth once daily.      pregabalin (LYRICA) 100 MG capsule Take 1 capsule (100 mg total) by mouth 2 (two) times a day. (Patient taking differently: Take 100 mg by mouth 3 (three) times daily.) 60 capsule 3    propranoloL (INDERAL) 20 MG tablet Take 10 mg by mouth once daily.      rosuvastatin (CRESTOR) 10 MG tablet Take 10 mg by mouth once daily.      tenofovir disoproxil fumarate (VIREAD) 300 mg Tab Take 300 mg by mouth once daily.      zolpidem (AMBIEN) 10 mg Tab Take 5 mg by mouth every evening.       No current facility-administered medications for this visit.     Facility-Administered Medications Ordered in Other Visits   Medication Dose Route Frequency Provider Last Rate Last Admin    electrolyte-A infusion   Intravenous Continuous Miriam Gary MD        lactated ringers infusion   Intravenous Continuous Miriam Gary MD           Review of patient's allergies indicates:   Allergen Reactions    Corn Swelling     Throat swelling    Benadryl allergy decongestant      hyperactivity    Codeine Anxiety     Other reaction(s): Hyperactive behavior    Corticosteroids (glucocorticoids) Anxiety     hyperactivity

## 2024-05-22 NOTE — PATIENT INSTRUCTIONS
Hold Jardiance 1 day prior and resume post op  Hold Metformin 7 days prior and resume post op  Hold MVI  7 days prior and resume post op

## 2024-05-22 NOTE — LETTER
May 22, 2024       [facility]  [address], [address]  [facility] [address]  Phone: [phone]  Fax: [phone]       Patient: Wen Morris   YOB: 1955  Date of Visit: 05/22/2024    To Whom It May Concern:    Ariela Morris  was at [facility] on 05/22/2024. If you have any questions or concerns, or if I can be of further assistance, please do not hesitate to contact me.    Sincerely,    Alcides Lilly NP

## 2024-06-18 ENCOUNTER — OFFICE VISIT (OUTPATIENT)
Dept: PAIN MEDICINE | Facility: CLINIC | Age: 69
End: 2024-06-18
Payer: OTHER GOVERNMENT

## 2024-06-18 VITALS
WEIGHT: 213 LBS | SYSTOLIC BLOOD PRESSURE: 137 MMHG | DIASTOLIC BLOOD PRESSURE: 73 MMHG | HEIGHT: 68 IN | HEART RATE: 86 BPM | TEMPERATURE: 98 F | BODY MASS INDEX: 32.28 KG/M2

## 2024-06-18 DIAGNOSIS — M47.816 LUMBAR FACET ARTHROPATHY: Primary | ICD-10-CM

## 2024-06-18 DIAGNOSIS — M54.16 LUMBAR RADICULOPATHY: ICD-10-CM

## 2024-06-18 PROCEDURE — 99213 OFFICE O/P EST LOW 20 MIN: CPT | Mod: ,,, | Performed by: NURSE PRACTITIONER

## 2024-06-18 NOTE — PROGRESS NOTES
[facility]  Pre-Operative Clinic Note      SUBJECTIVE    CHIEF COMPLAINT: Pre-op Exam (Pre op procedure 7/1/24 C/O pain level 5, not taking pain meds)       History of Present Illness: 69 y.o. male presents today for preoperative evaluation for radiofrequency ablation left L3-L4 on 07 /01/2024. I reviewed the indications for procedure. The risks and benefits of the proposed and alternative treatments were discussed with the patient. Questions pertinent to the procedure were solicited and answered. No assurances were given. Informed consent was obtained. The patient expressed good understanding and wished to proceed with scheduling the procedure.     Review of Systems:   Constitutional: No fever, weakness, or fatigue.   Ear/Nose/Mouth/Throat: No nasal congestion or sore throat.   Respiratory: No shortness of breath or cough.   Cardiovascular: No chest pain, palpitations, or peripheral edema.   Gastrointestinal: No nausea, vomiting, or abdominal pain.   Genitourinary: No dysuria.  Musculoskeletal:  Patient reports prolonged standing, walking, chores and yd work will exacerbate his lumbar axial spine especially if he has twisting at the waist and leaning back.    Past Surgical History:   Procedure Laterality Date    CARDIAC DEFIBRILLATOR PLACEMENT  2023    Dr. Nikolas Varela    CHOLECYSTECTOMY      COLONOSCOPY      EYE SURGERY      INJECTION OF ANESTHETIC AGENT AROUND MEDIAL BRANCH NERVES INNERVATING LUMBAR FACET JOINT Bilateral 2/28/2024    Procedure: Block-nerve-medial branch-lumbar;  Surgeon: Pauline Lala MD;  Location: [facility] OR;  Service: Pain Management;  Laterality: Bilateral;  MBB Malcom L3-5  contact [employer] [place] [phone] for magnet placement    INJECTION OF ANESTHETIC AGENT AROUND MEDIAL BRANCH NERVES INNERVATING LUMBAR FACET JOINT Bilateral 5/8/2024    Procedure: Block-nerve-medial branch-lumbar;  Surgeon: Pualine Lala MD;  Location: [facility] OR;  Service: Pain Management;   "Laterality: Bilateral;  MBB Malcom L3-5    NECK SURGERY      TONSILLECTOMY          Past Medical History:   Diagnosis Date    Arthritis     Bell palsy     Chronic neck pain     DM (diabetes mellitus)     Glaucoma     Headache     Hepatitis B     HTN (hypertension)     Memory loss     Neuralgia     Sleep apnea     does not use machine    Unspecified viral hepatitis B without hepatic coma         OBJECTIVE:    Visit Vitals  /73 (BP Location: Right arm, Patient Position: Sitting, BP Method: Medium (Automatic))   Pulse 86   Temp 98.3 °F (36.8 °C)   Ht 5' 8" (1.727 m)   Wt 96.6 kg (213 lb)   BMI 32.39 kg/m²     Vitals:    06/18/24 1402   PainSc:   5       Physical Exam:   General: Well-developed, well-nourished.  Neuro: Alert and oriented x 3.  Psych: Normal mood and affect.  HEENT: Normocephalic. PERRLA EOM intact. Nose and throat clear.  Lungs: Clear to auscultation and percussion.  Heart: Regular rate and rhythm   Abdomen: Soft non-tender. Bowel sounds positive. No rebound tenderness.  Skin: No rashes or open wounds  Musculoskeletal: Antalgic gait.  Independent ambulator.  Pain and limited range of motion with lumbar extension, lateral rotation and flexion.  Bilateral upper and lower extremities 5/5 strength and no sensory deficits to feet.    ASSESSMENT:  There are no diagnoses linked to this encounter.     PLAN:  Plan for to proceed with radiofrequency ablation left L3-L4 on 07 /01/2024. .   [x] The patient has been given preoperative instructions and prescriptions for post-operative medication.   [x] Medications to hold:  He will deactivate his cardiac defibrillator day of procedure and follow up post op  Hold Meloxicam and aspirin 7 days prior to procedure and resume post op  Hold Jardiance 1 day prior and resume post op  Hold Metformin 3 days prior and resume post op  Hold MVI  7 days prior and resume post op  [] Cardiac clearance received and reviewed.  [x] Post-operative appointment is scheduled for 2 " weeks.

## 2024-06-18 NOTE — LETTER
June 18, 2024       [facility]  [address], [address]  [facility] [address]  Phone: [phone]  Fax: [phone]       Patient: Wen Morris   YOB: 1955  Date of Visit: 06/18/2024    To Whom It May Concern:    Ariela Morris  was at [facility] on 06/18/2024. If you have any questions or concerns, or if I can be of further assistance, please do not hesitate to contact me.    Sincerely,    Alcides Lilly NP/mt

## 2024-06-18 NOTE — PATIENT INSTRUCTIONS
Deactivate your cardiac defibrillator day of procedure and reactivate year device  post op  Hold Jardiance 1 day prior and resume post op  Hold Metformin 3 days prior and resume post op  Hold MVI  7 days prior and resume post op  Hold Meloxicam and aspirin 7 days prior to procedure and resume post op

## 2024-07-02 ENCOUNTER — TELEPHONE (OUTPATIENT)
Dept: PAIN MEDICINE | Facility: CLINIC | Age: 69
End: 2024-07-02
Payer: OTHER GOVERNMENT

## 2024-07-02 NOTE — TELEPHONE ENCOUNTER
Pt contacted office on 6/24/24 requesting to cancel RFA for 7/01/24 and 8/05/24, f/u appt for RFA was cancelled also.

## 2024-08-07 DIAGNOSIS — M47.816 LUMBAR SPONDYLOSIS: ICD-10-CM

## 2024-08-07 DIAGNOSIS — M47.816 LUMBAR FACET ARTHROPATHY: Primary | ICD-10-CM

## 2024-08-07 DIAGNOSIS — M54.16 LUMBAR RADICULOPATHY: ICD-10-CM

## 2024-08-21 ENCOUNTER — OFFICE VISIT (OUTPATIENT)
Facility: CLINIC | Age: 69
End: 2024-08-21
Payer: OTHER GOVERNMENT

## 2024-08-21 VITALS
BODY MASS INDEX: 32.28 KG/M2 | HEART RATE: 87 BPM | DIASTOLIC BLOOD PRESSURE: 68 MMHG | SYSTOLIC BLOOD PRESSURE: 119 MMHG | WEIGHT: 213 LBS | HEIGHT: 68 IN | TEMPERATURE: 98 F

## 2024-08-21 DIAGNOSIS — M47.816 LUMBAR SPONDYLOSIS: Primary | ICD-10-CM

## 2024-08-21 DIAGNOSIS — M51.36 DDD (DEGENERATIVE DISC DISEASE), LUMBAR: ICD-10-CM

## 2024-08-21 DIAGNOSIS — M54.16 LUMBAR RADICULITIS: ICD-10-CM

## 2024-08-21 PROCEDURE — 99214 OFFICE O/P EST MOD 30 MIN: CPT | Mod: ,,, | Performed by: NURSE PRACTITIONER

## 2024-08-21 NOTE — LETTER
August 21, 2024      [facility]  [address]  [place] [address]  Phone: [phone]       Patient: Wen Morris   YOB: 1955  Date of Visit: 08/21/2024    To Whom It May Concern:    Ariela Morris  was at [facility] on 08/21/2024.  If you have any questions or concerns, or if I can be of further assistance, please do not hesitate to contact me.    Sincerely,    Alcides LillyNP

## 2024-08-21 NOTE — PATIENT INSTRUCTIONS
Medications to hold: Metformin 3 days prior to procedure. Patient to drink  32-64ounces of water the evening after procedure and resume Metformin post op day 2.    Hold Aspirin and Meloxicam 1 week prior to procedure and resume post op.   [x] Cardiac clearance received and reviewed.Request sent to staff to request instructions for clearance with [employer] defibrillator.staff will reach out to you with instructions for defibrillator.

## 2024-08-21 NOTE — LETTER
Date 24    Re:   Wen Jacobsonpard    :   24    Dr. Nikolas Varela ,           The above named patient is scheduled to have a Radiofrequency Ablation Lt L3-L4     On 24.       *Type of Anesthesia: Local MAc    This patient needs surgical clearance from your office for this procedure as patient has a defibrillator. Please send or fax the clearance letter ASAP.     Our fax number is [phone].          We appreciate your help in getting our patient cleared for surgery.  Please feel free to call our office should you have any questions, [phone].             Thank You,    Alcides Lilly NP

## 2024-08-21 NOTE — PROGRESS NOTES
[facility]    Subjective:           [facility]  Pre-Operative Clinic Note      SUBJECTIVE    CHIEF COMPLAINT: Pre-op Exam (Pre op procedure 9/9/24 C/O pain level 8, not taking pain meds)       History of Present Illness: 69 y.o. male presents today for preoperative evaluation for RFA left L3-L4. I reviewed the indications for procedure. The risks and benefits of the proposed and alternative treatments were discussed with the patient. Questions pertinent to the procedure were solicited and answered. No assurances were given. Informed consent was obtained. The patient expressed good understanding and wished to proceed with scheduling the procedure.     Review of Systems:   Constitutional: No fever, weakness, or fatigue.   Ear/Nose/Mouth/Throat: No nasal congestion or sore throat.   Respiratory: No shortness of breath or cough.   Cardiovascular: No chest pain, palpitations, or peripheral edema.   Gastrointestinal: No nausea, vomiting, or abdominal pain.   Genitourinary: No dysuria.  Musculoskeletal: low back pain worse with standing, chores, any activity. Pain will elevate to a 7-9/10 on most days with these activies.    Past Surgical History:   Procedure Laterality Date    CARDIAC DEFIBRILLATOR PLACEMENT  2023    Dr. Nikolas Varela    CHOLECYSTECTOMY      COLONOSCOPY      EYE SURGERY      INJECTION OF ANESTHETIC AGENT AROUND MEDIAL BRANCH NERVES INNERVATING LUMBAR FACET JOINT Bilateral 2/28/2024    Procedure: Block-nerve-medial branch-lumbar;  Surgeon: Pauline Lala MD;  Location: [facility] OR;  Service: Pain Management;  Laterality: Bilateral;  MBB Malcom L3-5  contact [facility] Rep. [facility] [phone] for magnet placement    INJECTION OF ANESTHETIC AGENT AROUND MEDIAL BRANCH NERVES INNERVATING LUMBAR FACET JOINT Bilateral 5/8/2024    Procedure: Block-nerve-medial branch-lumbar;  Surgeon: Pauline Lala MD;  Location: [facility] OR;  Service: Pain Management;  Laterality: Bilateral;  MBB Malcom L3-5  "   NECK SURGERY      TONSILLECTOMY          Past Medical History:   Diagnosis Date    Arthritis     Bell palsy     Chronic neck pain     DM (diabetes mellitus)     Glaucoma     Headache     Hepatitis B     HTN (hypertension)     Memory loss     Neuralgia     Sleep apnea     does not use machine    Unspecified viral hepatitis B without hepatic coma         OBJECTIVE:    Visit Vitals  /68 (BP Location: Right arm, Patient Position: Sitting, BP Method: Large (Automatic))   Pulse 87   Temp 98 °F (36.7 °C)   Ht 5' 8" (1.727 m)   Wt 96.6 kg (213 lb)   BMI 32.39 kg/m²     Vitals:    08/21/24 1254   PainSc:   8       Physical Exam:   General: Well-developed, well-nourished.  Neuro: Alert and oriented x 3.  Psych: Normal mood and affect.  HEENT: Normocephalic. PERRLA EOM intact. Nose and throat clear.  Lungs: Clear to auscultation and percussion.  Heart: Regular rate and rhythm   Abdomen: Soft non-tender. Bowel sounds positive. No rebound tenderness.  Skin: No rashes or open wounds  Musculoskeletal: antalgic gait. Pain with lumbar ROM with lumbar extension and rotation. Independent ambulator.     ASSESSMENT:  1. Lumbar spondylosis    2. DDD (degenerative disc disease), lumbar    3. Lumbar radiculitis       PLAN:  Plan for to proceed with RFA left L3-L4. .   [x] The patient has been given preoperative instructions and prescriptions for post-operative medication.   [x] Medications to hold: Metformin 3 days prior to procedure. Patient to drink  32-64ounces of water the evening after procedure and resume Metformin post op day 2.    Hold Aspirin and Meloxicam 1 week prior to procedure and resume post op.   [x] Cardiac clearance received and reviewed.Request sent to staff to request instructions for clearance with [employer] defibrillator/  [x] Post-operative appointment is scheduled for 2 weeks.        "

## 2024-09-23 ENCOUNTER — OFFICE VISIT (OUTPATIENT)
Facility: CLINIC | Age: 69
End: 2024-09-23
Payer: OTHER GOVERNMENT

## 2024-09-23 VITALS
WEIGHT: 213 LBS | BODY MASS INDEX: 32.28 KG/M2 | DIASTOLIC BLOOD PRESSURE: 79 MMHG | HEIGHT: 68 IN | SYSTOLIC BLOOD PRESSURE: 129 MMHG | HEART RATE: 71 BPM

## 2024-09-23 DIAGNOSIS — M47.816 ARTHRITIS OF FACET JOINT OF LUMBAR SPINE: Primary | ICD-10-CM

## 2024-09-23 DIAGNOSIS — M54.16 LUMBAR RADICULOPATHY: ICD-10-CM

## 2024-09-23 DIAGNOSIS — M54.16 LUMBAR RADICULITIS: ICD-10-CM

## 2024-09-23 DIAGNOSIS — M51.36 DDD (DEGENERATIVE DISC DISEASE), LUMBAR: ICD-10-CM

## 2024-09-23 PROCEDURE — 99214 OFFICE O/P EST MOD 30 MIN: CPT | Mod: ,,, | Performed by: NURSE PRACTITIONER

## 2024-09-23 NOTE — LETTER
September 23, 2024      [facility]  [address]  [place] [address]  Phone: [phone]  Fax: [phone]       Patient: eWn Morris   YOB: 1955  Date of Visit: 09/23/2024    To Whom It May Concern:    Ariela Morris  was at [facility] on 09/23/2024.  If you have any questions or concerns, or if I can be of further assistance, please do not hesitate to contact me.    Sincerely,    Alcides Lilly NP

## 2024-09-23 NOTE — PROGRESS NOTES
"  [facility]    Subjective:     Chief Complaint: Follow-up (F/u procedure 9/9/24 1st RFA C/O pain level 5, states procedure helped, not taking pain meds)    Referred by: Mindy Mcfarland He*     History of Present Illness: Wen Morris is a 69 y.o. male presents today as a postop follow-up for pain associated with lumbar facet arthropathy after receiving a left L3 -L4 radiofrequency ablation on 04/09/2024.  He received excellent pain relief enough to the point he was able to make a garden dedicated to [employer] in his extremely satisfied with the pain relief to the left side of his lumbar axial spine.  He was able to walk longer stand longer do chores and do hobbies just like this now; however, he has more pronounced pain on the right lumbar axial spine and would like to go ahead and complete his 2nd radiofrequency ablation to the right L3-L4.  He also has a cardiac defibrillator that he stops with his magnet prior to the procedure.  His pain score on the right lumbar axial spine as a 5/10 this will slightly elevate to a 7/10 when he is walking or planning these Gardens and other household chores.  He has more restorative sleep on the left side versus the right and thus he would like to proceed with the 2nd radiofrequency ablation.    Pertinent PSH:   has a cardiac defibrillator and stops this with his magnet prior to the procedure.    Vital signs:   Visit Vitals  /79 (BP Location: Right arm, Patient Position: Sitting, BP Method: Large (Automatic))   Pulse 71   Ht 5' 8" (1.727 m)   Wt 96.6 kg (213 lb)   BMI 32.39 kg/m²      Vitals:    09/23/24 1019   PainSc:   5     Pain Disability Index (PDI): 26       Interventional Pain History  09/09/2024:  Left RFA L3-L4  05/08/2024:  Bilateral MBB L3-5    ROS: Back pain          [facility] lumbar spine   [facility]         Objective:        Physical Exam     General: Well developed; overweight; " A&O x 3; No anxiety/depression; NAD  Mental Status: Oriented to person, palce and time. Displays appropriate mood & affect.  Head: Norm cephalic and atraumatic  Neck:  No cervical paraspinal banding.  Full range of motion with lateral turning and cervical flexion +extension.  Eyes: normal conjunctiva, normal lids, normal pupils  ENT and mouth: normal external ear, nose, and no lesions noted on the lips.  Respiratory: Symmetrical, Unlabored. No dyspnea  CV: normal rhythm and rate. No peripheral edema.   Abdomen: Non-distended     Extremities:  Gen: No cyanosis or tenderness to palpation bilateral upper and lower extremities  Skin: Warm, pink, dry, no rashes, no lesions on the lumbar spine  Strength: 5/5 motor strength bilateral upper and lower extremities  ROM: Full ROM in bilateral knees and ankles without pain or instability.     Neuro:  Gait: no altalgic lean, normal toe and heel raise. Independent ambulator.  DTR's: 2+ in bilateral patellar,   Sensory: Intact to light touch bilateral  upper and lower extremities     Spine: Normal lordosis. No scoliosis  L-spine ROM:  limited and painful  ROM to flexion, extension, bilateral rotation to right lumbar axial spine region.   Straight Leg Raise:  negative bilaterally  SI Joint: No tenderness to palpation bilaterally.        Assessment:     Wen Morris is a 69 y.o. male presents today as a postop follow-up for pain associated with lumbar facet arthropathy after receiving a left L3 -L4 radiofrequency ablation on 04/09/2024.  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  He was able to walk longer stand longer do chores and do hobbies just like this now; however, he has more pronounced pain on the right lumbar axial spine and would like to go ahead and complete his 2nd radiofrequency ablation to the  right L3-L4.  He also has a cardiac defibrillator that he stops with his magnet prior to the procedure.  His pain score on the right lumbar axial spine as a 5/10 this will slightly elevate to a 7/10 when he is walking or planning these Gardens and other household chores.  He has more restorative sleep on the left side versus the right and thus he would like to proceed with the 2nd radiofrequency ablation.      Plan of Care:   Request sent for Right RFA L3-L4   has a cardiac defibrillator and stops this with his magnet prior to the procedure.  Encounter Diagnoses   Name Primary?    Arthritis of facet joint of lumbar spine Yes    Lumbar radiculopathy     DDD (degenerative disc disease), lumbar          Plan:       Wen was seen today for follow-up.    Diagnoses and all orders for this visit:    Arthritis of facet joint of lumbar spine    Lumbar radiculopathy    DDD (degenerative disc disease), lumbar           Past Medical History:   Diagnosis Date    Arthritis     Bell palsy     Chronic neck pain     Coronary artery disease     DM (diabetes mellitus)     Glaucoma     Headache     Hepatitis B     HTN (hypertension)     Memory loss     Neuralgia     Sleep apnea     does not use machine    Unspecified viral hepatitis B without hepatic coma        Past Surgical History:   Procedure Laterality Date    CARDIAC DEFIBRILLATOR PLACEMENT  2023    Dr. Nikolas Varela    CHOLECYSTECTOMY      COLONOSCOPY      EYE SURGERY      INJECTION OF ANESTHETIC AGENT AROUND MEDIAL BRANCH NERVES INNERVATING LUMBAR FACET JOINT Bilateral 2/28/2024    Procedure: Block-nerve-medial branch-lumbar;  Surgeon: Pauline Lala MD;  Location: [facility];  Service: Pain Management;  Laterality: Bilateral;  MBB Malcom L3-5  contact [facility] Rep. Charles [phone] for magnet placement    INJECTION OF ANESTHETIC AGENT AROUND MEDIAL BRANCH NERVES INNERVATING LUMBAR FACET JOINT Bilateral 5/8/2024    Procedure: Block-nerve-medial branch-lumbar;   Surgeon: Pauline Lala MD;  Location: [facility] OR;  Service: Pain Management;  Laterality: Bilateral;  MBB Malcom L3-5    NECK SURGERY      RADIOFREQUENCY ABLATION Left 2024    Procedure: Radiofrequency Ablation;  Surgeon: Pauline Lala MD;  Location: [facility] OR;  Service: Pain Management;  Laterality: Left;  [facility])    TONSILLECTOMY         Family History   Problem Relation Name Age of Onset    Diabetes Mother      Cancer Father         Social History     Socioeconomic History    Marital status:    Tobacco Use    Smoking status: Former     Current packs/day: 0.00     Types: Cigarettes     Quit date:      Years since quittin.7    Smokeless tobacco: Never   Substance and Sexual Activity    Alcohol use: Yes     Alcohol/week: 3.0 - 4.0 standard drinks of alcohol     Types: 3 - 4 Cans of beer per week     Comment: 1    Drug use: Never    Sexual activity: Yes       Current Outpatient Medications   Medication Sig Dispense Refill    ascorbic acid, vitamin C, (VITAMIN C) 500 MG tablet Take 1 tablet by mouth every morning.      aspirin (ECOTRIN) 81 MG EC tablet Take 1 tablet by mouth every morning.      capsicum 0.075% (ZOSTRIX) 0.075 % topical cream Apply 1 application topically 2 (two) times a day. Apply with gloves      carvediloL (COREG) 25 MG tablet Take 12.5 mg by mouth 2 (two) times daily.      cetirizine (ZYRTEC) 10 MG tablet Take 10 mg by mouth.      chlorpheniramine (CHLOR-TRIMETON) 4 mg tablet Take 4 mg by mouth every 6 (six) hours as needed for Allergies.      cholecalciferol, vitamin D3, 125 mcg (5,000 unit) capsule Take 1 capsule by mouth once daily.      cyclobenzaprine (FLEXERIL) 10 MG tablet Take 10 mg by mouth 3 (three) times daily as needed for Muscle spasms.      donepeziL (ARICEPT) 10 MG tablet Take 10 mg by mouth every evening.      empagliflozin (JARDIANCE) 25 mg tablet Take 25 mg by mouth once daily.      entecavir (BARACLUDE) 1 MG Tab Take 1 mg by mouth once daily.       erenumab-aooe (AIMOVIG) 140 mg/mL autoinjector Inject into the skin.      FLUoxetine 20 MG capsule Take 20 mg by mouth once daily.      fluticasone propionate (FLONASE) 50 mcg/actuation nasal spray by Each Nostril route.      fremanezumab-vfrm (AJOVY AUTOINJECTOR) 225 mg/1.5 mL autoinjector Inject 225 mg into the skin every 30 days.      furosemide (LASIX) 20 MG tablet Take 20 mg by mouth once daily.      glipiZIDE (GLUCOTROL) 10 MG tablet Take 10 mg by mouth 2 (two) times daily before meals.      latanoprost 0.005 % ophthalmic solution Place 1 drop into both eyes every evening.      lisinopriL 10 MG tablet Take 10 mg by mouth 2 (two) times a day.      loratadine (CLARITIN) 10 mg tablet Take 10 mg by mouth once daily.      meloxicam (MOBIC) 15 MG tablet Take 15 mg by mouth once daily.      memantine (NAMENDA) 10 MG Tab Take 5 mg by mouth 2 (two) times daily.      metFORMIN (FORTAMET) 500 mg 24hr tablet Take 500 mg by mouth daily with breakfast.      multivit-min/ferrous fumarate (MULTI VITAMIN ORAL) Take 1 tablet by mouth Daily.      omeprazole (PRILOSEC) 20 MG capsule Take 20 mg by mouth once daily.      pregabalin (LYRICA) 100 MG capsule Take 1 capsule (100 mg total) by mouth 2 (two) times a day. (Patient taking differently: Take 100 mg by mouth 3 (three) times daily.) 60 capsule 3    propranoloL (INDERAL) 20 MG tablet Take 10 mg by mouth once daily.      rosuvastatin (CRESTOR) 10 MG tablet Take 10 mg by mouth once daily.      tenofovir disoproxil fumarate (VIREAD) 300 mg Tab Take 300 mg by mouth once daily.      zolpidem (AMBIEN) 10 mg Tab Take 5 mg by mouth every evening.       No current facility-administered medications for this visit.     Facility-Administered Medications Ordered in Other Visits   Medication Dose Route Frequency Provider Last Rate Last Admin    lactated ringers infusion   Intravenous Continuous Miriam Gary MD           Review of patient's allergies indicates:   Allergen Reactions     Corn Swelling     Throat swelling    Benadryl allergy decongestant      hyperactivity    Codeine Anxiety     Other reaction(s): Hyperactive behavior    Corticosteroids (glucocorticoids) Anxiety     hyperactivity

## 2024-09-23 NOTE — H&P (VIEW-ONLY)
"  [facility]    Subjective:     Chief Complaint: Follow-up (F/u procedure 9/9/24 1st RFA C/O pain level 5, states procedure helped, not taking pain meds)    Referred by: Mindy Mcfarland He*     History of Present Illness: Wen Morris is a 69 y.o. male presents today as a postop follow-up for pain associated with lumbar facet arthropathy after receiving a left L3 -L4 radiofrequency ablation on 04/09/2024.  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  He was able to walk longer stand longer do chores and do hobbies just like this now; however, he has more pronounced pain on the right lumbar axial spine and would like to go ahead and complete his 2nd radiofrequency ablation to the right L3-L4.  He also has a cardiac defibrillator that he stops with his magnet prior to the procedure.  His pain score on the right lumbar axial spine as a 5/10 this will slightly elevate to a 7/10 when he is walking or planning these Gardens and other household chores.  He has more restorative sleep on the left side versus the right and thus he would like to proceed with the 2nd radiofrequency ablation.    Pertinent PSH:   has a cardiac defibrillator and stops this with his magnet prior to the procedure.    Vital signs:   Visit Vitals  /79 (BP Location: Right arm, Patient Position: Sitting, BP Method: Large (Automatic))   Pulse 71   Ht 5' 8" (1.727 m)   Wt 96.6 kg (213 lb)   BMI 32.39 kg/m²      Vitals:    09/23/24 1019   PainSc:   5     Pain Disability Index (PDI): 26       Interventional Pain History  09/09/2024:  Left RFA L3-L4  05/08/2024:  Bilateral MBB L3-5    ROS: Back pain          MRI lumbar spine   [facility]         Objective:        Physical Exam     General: Well developed; overweight; " A&O x 3; No anxiety/depression; NAD  Mental Status: Oriented to person, palce and time. Displays appropriate mood & affect.  Head: Norm cephalic and atraumatic  Neck:  No cervical paraspinal banding.  Full range of motion with lateral turning and cervical flexion +extension.  Eyes: normal conjunctiva, normal lids, normal pupils  ENT and mouth: normal external ear, nose, and no lesions noted on the lips.  Respiratory: Symmetrical, Unlabored. No dyspnea  CV: normal rhythm and rate. No peripheral edema.   Abdomen: Non-distended     Extremities:  Gen: No cyanosis or tenderness to palpation bilateral upper and lower extremities  Skin: Warm, pink, dry, no rashes, no lesions on the lumbar spine  Strength: 5/5 motor strength bilateral upper and lower extremities  ROM: Full ROM in bilateral knees and ankles without pain or instability.     Neuro:  Gait: no altalgic lean, normal toe and heel raise. Independent ambulator.  DTR's: 2+ in bilateral patellar,   Sensory: Intact to light touch bilateral  upper and lower extremities     Spine: Normal lordosis. No scoliosis  L-spine ROM:  limited and painful  ROM to flexion, extension, bilateral rotation to right lumbar axial spine region.   Straight Leg Raise:  negative bilaterally  SI Joint: No tenderness to palpation bilaterally.        Assessment:     Wen Morris is a 69 y.o. male presents today as a postop follow-up for pain associated with lumbar facet arthropathy after receiving a left L3 -L4 radiofrequency ablation on 04/09/2024.  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  He was able to walk longer stand longer do chores and do hobbies just like this now; however, he has more pronounced pain on the right lumbar axial spine and would like to go ahead and complete his 2nd radiofrequency ablation to the  right L3-L4.  He also has a cardiac defibrillator that he stops with his magnet prior to the procedure.  His pain score on the right lumbar axial spine as a 5/10 this will slightly elevate to a 7/10 when he is walking or planning these Gardens and other household chores.  He has more restorative sleep on the left side versus the right and thus he would like to proceed with the 2nd radiofrequency ablation.      Plan of Care:   Request sent for Right RFA L3-L4   has a cardiac defibrillator and stops this with his magnet prior to the procedure.  Encounter Diagnoses   Name Primary?    Arthritis of facet joint of lumbar spine Yes    Lumbar radiculopathy     DDD (degenerative disc disease), lumbar          Plan:       Wen was seen today for follow-up.    Diagnoses and all orders for this visit:    Arthritis of facet joint of lumbar spine    Lumbar radiculopathy    DDD (degenerative disc disease), lumbar           Past Medical History:   Diagnosis Date    Arthritis     Bell palsy     Chronic neck pain     Coronary artery disease     DM (diabetes mellitus)     Glaucoma     Headache     Hepatitis B     HTN (hypertension)     Memory loss     Neuralgia     Sleep apnea     does not use machine    Unspecified viral hepatitis B without hepatic coma        Past Surgical History:   Procedure Laterality Date    CARDIAC DEFIBRILLATOR PLACEMENT  2023    Dr. Nikolas Varela    CHOLECYSTECTOMY      COLONOSCOPY      EYE SURGERY      INJECTION OF ANESTHETIC AGENT AROUND MEDIAL BRANCH NERVES INNERVATING LUMBAR FACET JOINT Bilateral 2/28/2024    Procedure: Block-nerve-medial branch-lumbar;  Surgeon: Pauline Lala MD;  Location: [facility];  Service: Pain Management;  Laterality: Bilateral;  MBB Malcom L3-5  contact [facility] Rep. Charles [phone] for magnet placement    INJECTION OF ANESTHETIC AGENT AROUND MEDIAL BRANCH NERVES INNERVATING LUMBAR FACET JOINT Bilateral 5/8/2024    Procedure: Block-nerve-medial branch-lumbar;   Surgeon: Pauline Lala MD;  Location: [facility] OR;  Service: Pain Management;  Laterality: Bilateral;  MBB Malcom L3-5    NECK SURGERY      RADIOFREQUENCY ABLATION Left 2024    Procedure: Radiofrequency Ablation;  Surgeon: Pauline Lala MD;  Location: [facility] OR;  Service: Pain Management;  Laterality: Left;  [facility] L[facility]L4 [facility]    TONSILLECTOMY         Family History   Problem Relation Name Age of Onset    Diabetes Mother      Cancer Father         Social History     Socioeconomic History    Marital status:    Tobacco Use    Smoking status: Former     Current packs/day: 0.00     Types: Cigarettes     Quit date:      Years since quittin.7    Smokeless tobacco: Never   Substance and Sexual Activity    Alcohol use: Yes     Alcohol/week: 3.0 - 4.0 standard drinks of alcohol     Types: 3 - 4 Cans of beer per week     Comment: 1    Drug use: Never    Sexual activity: Yes       Current Outpatient Medications   Medication Sig Dispense Refill    ascorbic acid, vitamin C, (VITAMIN C) 500 MG tablet Take 1 tablet by mouth every morning.      aspirin (ECOTRIN) 81 MG EC tablet Take 1 tablet by mouth every morning.      capsicum 0.075% (ZOSTRIX) 0.075 % topical cream Apply 1 application topically 2 (two) times a day. Apply with gloves      carvediloL (COREG) 25 MG tablet Take 12.5 mg by mouth 2 (two) times daily.      cetirizine (ZYRTEC) 10 MG tablet Take 10 mg by mouth.      chlorpheniramine (CHLOR-TRIMETON) 4 mg tablet Take 4 mg by mouth every 6 (six) hours as needed for Allergies.      cholecalciferol, vitamin D3, 125 mcg (5,000 unit) capsule Take 1 capsule by mouth once daily.      cyclobenzaprine (FLEXERIL) 10 MG tablet Take 10 mg by mouth 3 (three) times daily as needed for Muscle spasms.      donepeziL (ARICEPT) 10 MG tablet Take 10 mg by mouth every evening.      empagliflozin (JARDIANCE) 25 mg tablet Take 25 mg by mouth once daily.      entecavir (BARACLUDE) 1 MG Tab Take 1 mg by mouth once daily.       erenumab-aooe (AIMOVIG) 140 mg/mL autoinjector Inject into the skin.      FLUoxetine 20 MG capsule Take 20 mg by mouth once daily.      fluticasone propionate (FLONASE) 50 mcg/actuation nasal spray by Each Nostril route.      fremanezumab-vfrm (AJOVY AUTOINJECTOR) 225 mg/1.5 mL autoinjector Inject 225 mg into the skin every 30 days.      furosemide (LASIX) 20 MG tablet Take 20 mg by mouth once daily.      glipiZIDE (GLUCOTROL) 10 MG tablet Take 10 mg by mouth 2 (two) times daily before meals.      latanoprost 0.005 % ophthalmic solution Place 1 drop into both eyes every evening.      lisinopriL 10 MG tablet Take 10 mg by mouth 2 (two) times a day.      loratadine (CLARITIN) 10 mg tablet Take 10 mg by mouth once daily.      meloxicam (MOBIC) 15 MG tablet Take 15 mg by mouth once daily.      memantine (NAMENDA) 10 MG Tab Take 5 mg by mouth 2 (two) times daily.      metFORMIN (FORTAMET) 500 mg 24hr tablet Take 500 mg by mouth daily with breakfast.      multivit-min/ferrous fumarate (MULTI VITAMIN ORAL) Take 1 tablet by mouth Daily.      omeprazole (PRILOSEC) 20 MG capsule Take 20 mg by mouth once daily.      pregabalin (LYRICA) 100 MG capsule Take 1 capsule (100 mg total) by mouth 2 (two) times a day. (Patient taking differently: Take 100 mg by mouth 3 (three) times daily.) 60 capsule 3    propranoloL (INDERAL) 20 MG tablet Take 10 mg by mouth once daily.      rosuvastatin (CRESTOR) 10 MG tablet Take 10 mg by mouth once daily.      tenofovir disoproxil fumarate (VIREAD) 300 mg Tab Take 300 mg by mouth once daily.      zolpidem (AMBIEN) 10 mg Tab Take 5 mg by mouth every evening.       No current facility-administered medications for this visit.     Facility-Administered Medications Ordered in Other Visits   Medication Dose Route Frequency Provider Last Rate Last Admin    lactated ringers infusion   Intravenous Continuous Miriam Gary MD           Review of patient's allergies indicates:   Allergen Reactions     Corn Swelling     Throat swelling    Benadryl allergy decongestant      hyperactivity    Codeine Anxiety     Other reaction(s): Hyperactive behavior    Corticosteroids (glucocorticoids) Anxiety     hyperactivity

## 2024-10-14 ENCOUNTER — ANESTHESIA (OUTPATIENT)
Facility: HOSPITAL | Age: 69
End: 2024-10-14
Payer: OTHER GOVERNMENT

## 2024-10-14 ENCOUNTER — ANESTHESIA EVENT (OUTPATIENT)
Facility: HOSPITAL | Age: 69
End: 2024-10-14
Payer: OTHER GOVERNMENT

## 2024-10-14 ENCOUNTER — HOSPITAL ENCOUNTER (OUTPATIENT)
Facility: HOSPITAL | Age: 69
Discharge: HOME | End: 2024-10-14
Attending: ANESTHESIOLOGY | Admitting: ANESTHESIOLOGY
Payer: OTHER GOVERNMENT

## 2024-10-14 DIAGNOSIS — G89.29 CHRONIC BACK PAIN GREATER THAN 3 MONTHS DURATION: Primary | ICD-10-CM

## 2024-10-14 DIAGNOSIS — M47.816 LUMBAR SPONDYLOSIS: ICD-10-CM

## 2024-10-14 DIAGNOSIS — M54.9 CHRONIC BACK PAIN GREATER THAN 3 MONTHS DURATION: Primary | ICD-10-CM

## 2024-10-14 LAB
POCT GLUCOSE: 196 MG/DL (ref 70–110)
POCT GLUCOSE: 198 MG/DL (ref 70–110)

## 2024-10-14 PROCEDURE — 64635 DESTROY LUMB/SAC FACET JNT: CPT | Mod: RT | Performed by: ANESTHESIOLOGY

## 2024-10-14 PROCEDURE — 64635 DESTROY LUMB/SAC FACET JNT: CPT | Mod: RT,,, | Performed by: ANESTHESIOLOGY

## 2024-10-14 PROCEDURE — 63600175 PHARM REV CODE 636 W HCPCS: Mod: JZ | Performed by: ANESTHESIOLOGY

## 2024-10-14 PROCEDURE — 63600175 PHARM REV CODE 636 W HCPCS: Performed by: NURSE ANESTHETIST, CERTIFIED REGISTERED

## 2024-10-14 PROCEDURE — D9220A PRA ANESTHESIA: Mod: ANES,,, | Performed by: ANESTHESIOLOGY

## 2024-10-14 PROCEDURE — D9220A PRA ANESTHESIA: Mod: CRNA,,, | Performed by: NURSE ANESTHETIST, CERTIFIED REGISTERED

## 2024-10-14 PROCEDURE — 37000008 HC ANESTHESIA 1ST 15 MINUTES: Performed by: ANESTHESIOLOGY

## 2024-10-14 RX ORDER — LIDOCAINE HYDROCHLORIDE 10 MG/ML
INJECTION, SOLUTION INFILTRATION; PERINEURAL
Status: DISCONTINUED | OUTPATIENT
Start: 2024-10-14 | End: 2024-10-14 | Stop reason: HOSPADM

## 2024-10-14 RX ORDER — TRIAMCINOLONE ACETONIDE 40 MG/ML
INJECTION, SUSPENSION INTRA-ARTICULAR; INTRAMUSCULAR
Status: DISCONTINUED
Start: 2024-10-14 | End: 2024-10-14 | Stop reason: HOSPADM

## 2024-10-14 RX ORDER — SODIUM CHLORIDE, SODIUM GLUCONATE, SODIUM ACETATE, POTASSIUM CHLORIDE AND MAGNESIUM CHLORIDE 30; 37; 368; 526; 502 MG/100ML; MG/100ML; MG/100ML; MG/100ML; MG/100ML
INJECTION, SOLUTION INTRAVENOUS CONTINUOUS
Status: DISCONTINUED | OUTPATIENT
Start: 2024-10-14 | End: 2024-10-14 | Stop reason: HOSPADM

## 2024-10-14 RX ORDER — LIDOCAINE HYDROCHLORIDE 10 MG/ML
INJECTION, SOLUTION EPIDURAL; INFILTRATION; INTRACAUDAL; PERINEURAL
Status: DISCONTINUED | OUTPATIENT
Start: 2024-10-14 | End: 2024-10-14

## 2024-10-14 RX ORDER — BUPIVACAINE HYDROCHLORIDE 2.5 MG/ML
INJECTION, SOLUTION EPIDURAL; INFILTRATION; INTRACAUDAL
Status: DISCONTINUED
Start: 2024-10-14 | End: 2024-10-14 | Stop reason: HOSPADM

## 2024-10-14 RX ORDER — LIDOCAINE HYDROCHLORIDE 10 MG/ML
1 INJECTION, SOLUTION EPIDURAL; INFILTRATION; INTRACAUDAL; PERINEURAL ONCE
Status: DISCONTINUED | OUTPATIENT
Start: 2024-10-14 | End: 2024-10-14 | Stop reason: HOSPADM

## 2024-10-14 RX ORDER — LIDOCAINE HYDROCHLORIDE 10 MG/ML
INJECTION, SOLUTION EPIDURAL; INFILTRATION; INTRACAUDAL; PERINEURAL
Status: DISCONTINUED
Start: 2024-10-14 | End: 2024-10-14 | Stop reason: HOSPADM

## 2024-10-14 RX ORDER — PROPOFOL 10 MG/ML
VIAL (ML) INTRAVENOUS
Status: DISCONTINUED | OUTPATIENT
Start: 2024-10-14 | End: 2024-10-14

## 2024-10-14 RX ORDER — TRIAMCINOLONE ACETONIDE 40 MG/ML
INJECTION, SUSPENSION INTRA-ARTICULAR; INTRAMUSCULAR
Status: DISCONTINUED | OUTPATIENT
Start: 2024-10-14 | End: 2024-10-14 | Stop reason: HOSPADM

## 2024-10-14 RX ORDER — SODIUM CHLORIDE 0.9 % (FLUSH) 0.9 %
10 SYRINGE (ML) INJECTION
Status: DISCONTINUED | OUTPATIENT
Start: 2024-10-14 | End: 2024-10-14 | Stop reason: HOSPADM

## 2024-10-14 RX ORDER — ONDANSETRON HYDROCHLORIDE 2 MG/ML
4 INJECTION, SOLUTION INTRAVENOUS DAILY PRN
Status: DISCONTINUED | OUTPATIENT
Start: 2024-10-14 | End: 2024-10-14 | Stop reason: HOSPADM

## 2024-10-14 RX ORDER — BUPIVACAINE HYDROCHLORIDE 2.5 MG/ML
INJECTION, SOLUTION EPIDURAL; INFILTRATION; INTRACAUDAL
Status: DISCONTINUED | OUTPATIENT
Start: 2024-10-14 | End: 2024-10-14 | Stop reason: HOSPADM

## 2024-10-14 RX ADMIN — PROPOFOL 70 MG: 10 INJECTION, EMULSION INTRAVENOUS at 08:10

## 2024-10-14 RX ADMIN — PROPOFOL 30 MG: 10 INJECTION, EMULSION INTRAVENOUS at 08:10

## 2024-10-14 RX ADMIN — LIDOCAINE HYDROCHLORIDE 20 MG: 10 INJECTION, SOLUTION EPIDURAL; INFILTRATION; INTRACAUDAL; PERINEURAL at 08:10

## 2024-10-14 NOTE — ANESTHESIA POSTPROCEDURE EVALUATION
Anesthesia Post Evaluation    Patient: Wen Morris    Procedure(s) Performed: Procedure(s) (LRB):  RADIOFREQUENCY ABLATION  ////RFA Rt L3-L4 (Right)    Final Anesthesia Type: general      Patient location during evaluation: OPS  Patient participation: Yes- Able to Participate  Level of consciousness: awake and alert and oriented  Post-procedure vital signs: reviewed and stable  Airway patency: patent    PONV status at discharge: No PONV  Anesthetic complications: no      Cardiovascular status: blood pressure returned to baseline  Respiratory status: unassisted  Hydration status: euvolemic  Follow-up not needed.              Vitals Value Taken Time   /72 10/14/24 0648   Temp 36.4 °C (97.6 °F) 10/14/24 0648   Pulse 72 10/14/24 0648   Resp 18 10/14/24 0830   SpO2 96 % 10/14/24 0648         No case tracking events are documented in the log.      Pain/Heather Score: No data recorded

## 2024-10-14 NOTE — TRANSFER OF CARE
"Anesthesia Transfer of Care Note    Patient: Wen Morris    Procedure(s) Performed: Procedure(s) (LRB):  RADIOFREQUENCY ABLATION  ////RFA Rt L3-L4 (Right)    Patient location: OPS    Anesthesia Type: general    Transport from OR: Transported from OR on room air with adequate spontaneous ventilation    Post pain: adequate analgesia    Post assessment: no apparent anesthetic complications    Post vital signs: stable    Level of consciousness: awake, alert and oriented    Complications: none    Transfer of care protocol was followed      Last vitals: Visit Vitals  BP (!) 163/72   Pulse 72   Temp 36.4 °C (97.6 °F) (Oral)   Ht 5' 8" (1.727 m)   Wt 98 kg (216 lb)   SpO2 96%   BMI 32.84 kg/m²     "

## 2024-10-14 NOTE — DISCHARGE INSTRUCTIONS
Radiofrequency Ablation After Care    Wash your hands before and after touching your puncture site.  You may take the bandage off and shower tomorrow. Check for signs and symptoms of infection daily: redness, warmth, pus or drainage, increase swelling, and foul odor.  No heavy lifting for 1 week.  No driving today.  You may resume your regular diet today.  You may resume your regular activities tomorrow.  No heating pad on the puncture site for 24 hours. You may use an ice pack for pain or swelling for no longer than 15 minutes at a time for 3-4 times a day. Do not place ice directly on your skin.    Contact your doctor for:  Increase pain not controlled with medication.  Any new numbness or tingling.  Fever greater than 102 degree Fahrenheit that does not break with medication.  Any signs or symptoms of infection: redness, warmth, pus or drainage, increase swelling, and foul odor at the puncture site.

## 2024-10-14 NOTE — ANESTHESIA PREPROCEDURE EVALUATION
10/14/2024  Wen Morris is a 69 y.o., male with   -------------------------------------    Arthritis    Bell palsy    Chronic neck pain    Coronary artery disease    DM (diabetes mellitus)    Glaucoma    Headache    Hepatitis B    HTN (hypertension)    Memory loss    Neuralgia    Sleep apnea    does not use machine    Unspecified viral hepatitis B without hepatic coma       And   ----------------------------    Cardiac defibrillator placement    Dr. Nikolas Varela    Cholecystectomy    Colonoscopy    Eye surgery    Injection of anesthetic agent around medial branch nerves innervating lumbar facet joint    Procedure: Block-nerve-medial branch-lumbar;  Surgeon: Pauline Lala MD;  Location: [facility] OR;  Service: Pain Management;  Laterality: Bilateral;  MBB Malcom L3-5  contact [employer] [phone] for magnet placement    Injection of anesthetic agent around medial branch nerves innervating lumbar facet joint    Procedure: Block-nerve-medial branch-lumbar;  Surgeon: Pauline Lala MD;  Location: [facility] OR;  Service: Pain Management;  Laterality: Bilateral;  MBB Malcom L3-5    Neck surgery    Radiofrequency ablation    Procedure: Radiofrequency Ablation;  Surgeon: Pauline Lala MD;  Location: [facility] OR;  Service: Pain Management;  Laterality: Left;  [facility] L[facility]-L4 ([facility])    Tonsillectomy       Presents for right L3-4 RFA  Recent previous RFA         Pre-op Assessment    I have reviewed the NPO Status.      Review of Systems  Cardiovascular:     Hypertension   CAD                  Coronary Artery Disease:                            Hypertension         Pulmonary:        Sleep Apnea     Obstructive Sleep Apnea (BRIAN).           Hepatic/GI:      Liver Disease, Hepatitis        Liver Disease, Hepatitis        Musculoskeletal:  Arthritis        Arthritis          Neurological:    Neuromuscular Disease,   Headaches      Dx of Headaches   Arthritis                         Neuromuscular Disease   Endocrine:  Diabetes    Diabetes                          Physical Exam  General: Well nourished, Cooperative, Alert and Oriented  Appears older than stated age, pleasant  Airway:  Mallampati: II   Mouth Opening: Normal  TM Distance: Normal  Tongue: Normal  Neck ROM: Normal ROM  Redundant tissue  Dental:  Edentulous    Chest/Lungs:  Clear to auscultation, Normal Respiratory Rate    Heart:  Rate: Normal  Rhythm: Regular Rhythm        Anesthesia Plan  Type of Anesthesia, risks & benefits discussed:    Anesthesia Type: Gen Natural Airway  Intra-op Monitoring Plan: Standard ASA Monitors  Post Op Pain Control Plan: IV/PO Opioids PRN  Induction:  IV  Airway Plan: Direct  Informed Consent: Informed consent signed with the Patient and all parties understand the risks and agree with anesthesia plan.  All questions answered. Patient consented to blood products? No  ASA Score: 3  Day of Surgery Review of History & Physical: H&P Update referred to the surgeon/provider.  Anesthesia Plan Notes: Nasal cannula vs facemask supplemental oxygenation   For patients with BRIAN/obesity, may consider SuperNoval Nasal CPAP      Ready For Surgery From Anesthesia Perspective.     .

## 2024-10-14 NOTE — DISCHARGE SUMMARY
[facility] Surgical - Periop Services 2nd Floor  Discharge Note  Short Stay    Procedure(s) (LRB):  RADIOFREQUENCY ABLATION  ////RFA Rt L3-L4 (Right)      OUTCOME: Patient tolerated treatment/procedure well without complication and is now ready for discharge.    DISPOSITION: Home or Self Care    FINAL DIAGNOSIS:  <principal problem not specified>    FOLLOWUP: In clinic    DISCHARGE INSTRUCTIONS:  No discharge procedures on file.     TIME SPENT ON DISCHARGE: 5 minutes

## 2024-10-14 NOTE — OP NOTE
Right L3-4 medial branch Radiofrequency Ablation    Pre-Procedure Diagnoses:  1. Chronic pain syndrome  2. Chronic Low back pain  3. Lumbar facet arthropathy    Post-Procedure Diagnoses:  1. Chronic pain syndrome  2. Chronic Low back pain  3. Lumbar facet arthropathy    Anesthesia:  Local and MAC    Estimated Blood Loss:  None    Complications:  None    Informed Consent:  The procedure, risks, benefits, and alternatives were discussed with the patient. There were no contraindications to the procedure. The patient expressed understanding and agreed to proceed. Fully informed written consent was obtained.     Description of the Procedure:  The patient was taken to the operating room. IV access was obtained prior to the start of the procedure. The patient was positioned prone on the fluoroscopy table. Continuous hemodynamic monitoring was initiated and continued throughout the duration of the procedure. The skin overlying the lumbosacral spine was prepped with Chloraprep and draped into a sterile field. Fluoroscopy was used to identify the locations of the right L3 and L4 medial branch nerves at the junctions of the superior articular processes and transverse processes of L4 and L5, respectively. Skin anesthesia was achieved using 1 mL of 1% lidocaine over each injection site. An 18-gauge 100 mm RF needle was slowly inserted and advanced under intermittent fluoroscopic guidance until it made bony contact at the target sites. Proper needle position was confirmed under AP, oblique, and lateral fluoroscopic views. Negative aspiration for blood or CSF was confirmed. An RF probe was placed through each needle. Impedence values were low. Motor testing was performed, which confirmed no stimulation of the lower extremity. Radiofrequency lesioning was then carried out at 80 degrees Celsius for 90 seconds at each level. The probes were withdrawn and each needle was injected with a combination of 0.5 ml of 0.25% bupivacaine and  10 mg of Kenalog. There was no pain on injection. The needles were removed intact and bleeding was nil. Sterile bandages were applied. The patient was taken to the recovery room for further observation in stable condition. The patient was then discharged home without any complications.

## 2024-10-16 VITALS
OXYGEN SATURATION: 98 % | HEART RATE: 65 BPM | DIASTOLIC BLOOD PRESSURE: 74 MMHG | BODY MASS INDEX: 32.74 KG/M2 | WEIGHT: 216 LBS | RESPIRATION RATE: 17 BRPM | SYSTOLIC BLOOD PRESSURE: 147 MMHG | HEIGHT: 68 IN | TEMPERATURE: 98 F

## 2024-11-01 ENCOUNTER — OFFICE VISIT (OUTPATIENT)
Facility: CLINIC | Age: 69
End: 2024-11-01
Payer: OTHER GOVERNMENT

## 2024-11-01 VITALS
HEIGHT: 68 IN | SYSTOLIC BLOOD PRESSURE: 139 MMHG | HEART RATE: 89 BPM | WEIGHT: 215 LBS | TEMPERATURE: 98 F | RESPIRATION RATE: 20 BRPM | DIASTOLIC BLOOD PRESSURE: 76 MMHG | BODY MASS INDEX: 32.58 KG/M2

## 2024-11-01 DIAGNOSIS — M51.360 DEGENERATION OF INTERVERTEBRAL DISC OF LUMBAR REGION WITH DISCOGENIC BACK PAIN: ICD-10-CM

## 2024-11-01 DIAGNOSIS — M54.16 LUMBAR RADICULOPATHY: ICD-10-CM

## 2024-11-01 DIAGNOSIS — M47.816 LUMBAR FACET ARTHROPATHY: Primary | ICD-10-CM

## 2024-11-01 PROCEDURE — 99214 OFFICE O/P EST MOD 30 MIN: CPT | Mod: ,,, | Performed by: NURSE PRACTITIONER

## 2024-11-01 NOTE — PROGRESS NOTES
Pain Management Clinic  Subjective:     Chief Complaint:  - ([facility]) 2nd [facility]FA 10/14/24--mt 10/21/24 r/s from 10/28/24, De (Pain mention pain in both knees( no pain meds ) pain level 6 /Back pain has improved)      History of Present Illness: Wen Morris is a 69 y.o. male  who presents today as a postop follow-up for pain associated with lumbar facet arthropathy after completing a right RFA L3-L4 on 10/14/2024.  Ormsby reports he has 100% relief of pain associated with lumbar axial spine facet arthropathy after completing his 2nd radiofrequency ablation L3-L4.  He can now walk for longer periods of time do household chores and yd work without pain.  He also has more restorative sleep.  His pain score today as 5/10 .  Prior to receiving bilateral radiofrequency ablation his pain score would elevate to a 7/10 or higher with gardening and other household chores.  Now he can do these and does not have to worry about pain.  He will follow-up in 6 months or sooner if needed for lumbar axial spine pain to evaluate.  His pain score today as a 5/10.          Ormsby's prior office visit with me on 09/23/2024 included the following HPI:    Wen Morris is a 69 y.o. male presents today as a postop follow-up for pain associated with lumbar facet arthropathy after receiving a left L3 -L4 radiofrequency ablation on 04/09/2024.  He received excellent pain relief enough to the point he was able to make a garden dedicated to fallen [employer] soldiers in his extremely satisfied with the pain relief to the left side of his lumbar axial spine.  He was able to walk longer stand longer do chores and do hobbies just like this now; however, he has more pronounced pain on the right lumbar axial spine and would like to go ahead and complete his 2nd radiofrequency ablation to the right L3-L4.  He also has a cardiac defibrillator that he stops with his magnet prior to the procedure.  His pain score on the right lumbar axial spine as a  "5/10 this will slightly elevate to a 7/10 when he is walking or planning these Gardens and other household chores.  He has more restorative sleep on the left side versus the right and thus he would like to proceed with the 2nd radiofrequency ablation.     Pertinent PSH:  Fithian has a cardiac defibrillator and stops this with his magnet prior to the procedure.       Vital signs:   Visit Vitals  /76   Pulse 89   Temp 98.3 °F (36.8 °C)   Resp 20   Ht 5' 8" (1.727 m)   Wt 97.5 kg (215 lb)   BMI 32.69 kg/m²      Vitals:    11/01/24 1018   PainSc:   5     Pain Disability Index (PDI): 0       Interventional Pain History  10/14/2024:  Right RFA L3-L4  09/09/2024:  Left RFA L3-L4  05/08/2024:  Bilateral MBB L3-5     ROS: Back pain    MRI lumbar spine 2024:   [facility]     Objective:        Physical Exam      General: Well developed; overweight; A&O x 3; No anxiety/depression; NAD  Mental Status: Oriented to person, palce and time. Displays appropriate mood & affect.  Head: Norm cephalic and atraumatic  Neck:  No cervical paraspinal banding.  Full range of motion with lateral turning and cervical flexion +extension.  Eyes: normal conjunctiva, normal lids, normal pupils  ENT and mouth: normal external ear, nose, and no lesions noted on the lips.  Respiratory: Symmetrical, Unlabored. No dyspnea  CV: normal rhythm and rate. No peripheral edema.   Abdomen: Non-distended     Extremities:  Gen: No cyanosis or tenderness to palpation bilateral upper and lower extremities  Skin: Warm, pink, dry, no rashes, no lesions on the lumbar spine  Strength: 5/5 motor strength bilateral upper and lower extremities  ROM: Full ROM in bilateral knees and ankles without pain or instability.     Neuro:  Gait: no altalgic lean, normal toe and heel raise. Independent ambulator.  DTR's: 2+ in bilateral patellar,   Sensory: Intact to light touch bilateral  upper and lower extremities     Spine: Normal " lordosis. No scoliosis  L-spine ROM:  full ROM to flexion, extension, bilateral rotation to right lumbar axial spine region.   Straight Leg Raise:  negative bilaterally  SI Joint: No tenderness to palpation bilaterally.  Assessment:     Wen Morris is a 69 y.o. male  who presents today as a postop follow-up for pain associated with lumbar facet arthropathy after completing a right RFA L3-L4 on 10/14/2024.  Ackley reports he has 100% relief of pain associated with lumbar axial spine facet arthropathy after completing his 2nd radiofrequency ablation L3-L4.  He can now walk for longer periods of time do household chores and yd work without pain.  He also has more restorative sleep.  His pain score today as 5/10 .  Prior to receiving bilateral radiofrequency ablation his pain score would elevate to a 7/10 or higher with gardening and other household chores.  Now he can do these and does not have to worry about pain.  He will follow-up in 6 months or sooner if needed for lumbar axial spine pain to evaluate.  His pain score today as a 5/10.    Plan of Care:  follow up in 6 months to evaluate lumbar axial spine pain post RFAs  Vet to call back as needed  Encounter Diagnoses   Name Primary?    Lumbar facet arthropathy Yes    Lumbar radiculopathy     Degeneration of intervertebral disc of lumbar region with discogenic back pain          Plan:              Past Medical History:   Diagnosis Date    Arthritis     Bell palsy     Chronic neck pain     Coronary artery disease     DM (diabetes mellitus)     Glaucoma     Headache     Hepatitis B     HTN (hypertension)     Memory loss     Neuralgia     Sleep apnea     does not use machine    Unspecified viral hepatitis B without hepatic coma        Past Surgical History:   Procedure Laterality Date    CARDIAC DEFIBRILLATOR PLACEMENT  2023    Dr. Nikolas Varela    CHOLECYSTECTOMY      COLONOSCOPY      EYE SURGERY      INJECTION OF ANESTHETIC AGENT AROUND MEDIAL BRANCH  NERVES INNERVATING LUMBAR FACET JOINT Bilateral 2024    Procedure: Block-nerve-medial branch-lumbar;  Surgeon: Pauline Lala MD;  Location: [facility] OR;  Service: Pain Management;  Laterality: Bilateral;  MBB Malcom L3-5  contact [employer] [place] [phone] for magnet placement    INJECTION OF ANESTHETIC AGENT AROUND MEDIAL BRANCH NERVES INNERVATING LUMBAR FACET JOINT Bilateral 2024    Procedure: Block-nerve-medial branch-lumbar;  Surgeon: Pauline Lala MD;  Location: [facility] OR;  Service: Pain Management;  Laterality: Bilateral;  MBB Malcom L3-5    NECK SURGERY      RADIOFREQUENCY ABLATION Left 2024    Procedure: Radiofrequency Ablation;  Surgeon: Pauline Lala MD;  Location: [facility] OR;  Service: Pain Management;  Laterality: Left;  [facility] L[facility]-L4 ([facility])    RADIOFREQUENCY ABLATION Right 10/14/2024    Procedure: RADIOFREQUENCY ABLATION  ////RFA Rt L3-L4;  Surgeon: Pauline Lala MD;  Location: [facility] OR;  Service: Pain Management;  Laterality: Right;    TONSILLECTOMY         Family History   Problem Relation Name Age of Onset    Diabetes Mother      Cancer Father         Social History     Socioeconomic History    Marital status:    Tobacco Use    Smoking status: Former     Current packs/day: 0.00     Types: Cigarettes     Quit date:      Years since quittin.8    Smokeless tobacco: Never   Substance and Sexual Activity    Alcohol use: Yes     Alcohol/week: 3.0 - 4.0 standard drinks of alcohol     Types: 3 - 4 Cans of beer per week     Comment: 1    Drug use: Never    Sexual activity: Yes     Partners: Female       Current Outpatient Medications   Medication Sig Dispense Refill    ascorbic acid, vitamin C, (VITAMIN C) 500 MG tablet Take 1 tablet by mouth every morning.      aspirin (ECOTRIN) 81 MG EC tablet Take 1 tablet by mouth every morning.      capsicum 0.075% (ZOSTRIX) 0.075 % topical cream Apply 1 application topically 2 (two) times a day. Apply with gloves       carvediloL (COREG) 25 MG tablet Take 12.5 mg by mouth 2 (two) times daily.      cetirizine (ZYRTEC) 10 MG tablet Take 10 mg by mouth.      chlorpheniramine (CHLOR-TRIMETON) 4 mg tablet Take 4 mg by mouth every 6 (six) hours as needed for Allergies.      cholecalciferol, vitamin D3, 125 mcg (5,000 unit) capsule Take 1 capsule by mouth once daily.      cyclobenzaprine (FLEXERIL) 10 MG tablet Take 10 mg by mouth 3 (three) times daily as needed for Muscle spasms.      donepeziL (ARICEPT) 10 MG tablet Take 10 mg by mouth every evening.      empagliflozin (JARDIANCE) 25 mg tablet Take 25 mg by mouth once daily.      entecavir (BARACLUDE) 1 MG Tab Take 1 mg by mouth once daily.      erenumab-aooe (AIMOVIG) 140 mg/mL autoinjector Inject into the skin.      FLUoxetine 20 MG capsule Take 20 mg by mouth once daily.      fluticasone propionate (FLONASE) 50 mcg/actuation nasal spray by Each Nostril route.      fremanezumab-vfrm (AJOVY AUTOINJECTOR) 225 mg/1.5 mL autoinjector Inject 225 mg into the skin every 30 days.      furosemide (LASIX) 20 MG tablet Take 20 mg by mouth once daily.      glipiZIDE (GLUCOTROL) 10 MG tablet Take 10 mg by mouth 2 (two) times daily before meals.      latanoprost 0.005 % ophthalmic solution Place 1 drop into both eyes every evening.      lisinopriL 10 MG tablet Take 10 mg by mouth 2 (two) times a day.      loratadine (CLARITIN) 10 mg tablet Take 10 mg by mouth once daily.      meloxicam (MOBIC) 15 MG tablet Take 15 mg by mouth once daily.      memantine (NAMENDA) 10 MG Tab Take 5 mg by mouth 2 (two) times daily.      metFORMIN (FORTAMET) 500 mg 24hr tablet Take 500 mg by mouth daily with breakfast.      multivit-min/ferrous fumarate (MULTI VITAMIN ORAL) Take 1 tablet by mouth Daily.      omeprazole (PRILOSEC) 20 MG capsule Take 20 mg by mouth once daily.      pregabalin (LYRICA) 100 MG capsule Take 1 capsule (100 mg total) by mouth 2 (two) times a day. (Patient taking differently: Take 100 mg by  "mouth 3 (three) times daily.) 60 capsule 3    propranoloL (INDERAL) 20 MG tablet Take 10 mg by mouth once daily.      zolpidem (AMBIEN) 10 mg Tab Take 5 mg by mouth every evening.      rosuvastatin (CRESTOR) 10 MG tablet Take 10 mg by mouth once daily.      tenofovir disoproxil fumarate (VIREAD) 300 mg Tab Take 300 mg by mouth once daily.       No current facility-administered medications for this visit.     Facility-Administered Medications Ordered in Other Visits   Medication Dose Route Frequency Provider Last Rate Last Admin    lactated ringers infusion   Intravenous Continuous Miriam Gary MD           Review of patient's allergies indicates:   Allergen Reactions    Corn Swelling     Throat swelling    Benadryl allergy decongestant      hyperactivity    Hydrocodone Other (See Comments)     "I get hyper"    Codeine Anxiety     Other reaction(s): Hyperactive behavior    Corticosteroids (glucocorticoids) Anxiety     hyperactivity             "

## 2024-11-15 NOTE — PROGRESS NOTES
Subjective:       Patient ID: Wen Morris is a 69 y.o. male.    Chief Complaint: occipital neuralgia and Migraine    Migraine     He's on aimovig ([facility] instead of ajovy - I'm not sure what happened)  It's working well - he's had a couple minor migraines and 2 major ones  He is happy with the way things are right now    Prev tried: gabapentin, fluoxetine, coreg, lyrica, flexeril, robaxin, nurtec  ONB have prev not provided relief  Tried steroids - didn't work, plus he's diabetic  H/o CAD & reported cva - triptans contraindicated    Review of Systems   A 14pt ROS was reviewed & is negative unless otherwise documented in the HPI    Objective:      Physical Exam    GENERAL: NAD, calm, cooperative, appropriate, smiling today  Awake/alert  Well groomed  RESP: CTAB  HEART: RRR  No LE edema  MENTAL STATUS: oriented, follow commands reliably  SPEECH/LANGUAGE: clear, fluent  CN:  Perrla, eomi, vff, gaze conjugate  No tactile or motor facial asymmetry  Tongue protrudes midline  Motor: no focal weakness  Cerebellar: no tremor or dysmetria  Sensory: normal to tactile stim/vibration  DTRs: normal +2, symmetric  Gait: steady    1. Chronic migraine without aura without status migrainosus, not intractable  -     erenumab-aooe (AIMOVIG) 140 mg/mL autoinjector; Inject 1 mL (140 mg total) into the skin every 30 days.  Dispense: 1 mL; Refill: 12    2. Occipital neuralgia of left side        PLAN:  Cont aimovig 140/mo  F/u 6mo    Coco Zuniga, [facility]

## 2024-11-18 ENCOUNTER — OFFICE VISIT (OUTPATIENT)
Dept: NEUROLOGY | Facility: CLINIC | Age: 69
End: 2024-11-18
Payer: OTHER GOVERNMENT

## 2024-11-18 VITALS
BODY MASS INDEX: 31.98 KG/M2 | SYSTOLIC BLOOD PRESSURE: 130 MMHG | DIASTOLIC BLOOD PRESSURE: 84 MMHG | HEIGHT: 68 IN | WEIGHT: 211 LBS

## 2024-11-18 DIAGNOSIS — M54.81 OCCIPITAL NEURALGIA OF LEFT SIDE: Chronic | ICD-10-CM

## 2024-11-18 DIAGNOSIS — G43.709 CHRONIC MIGRAINE WITHOUT AURA WITHOUT STATUS MIGRAINOSUS, NOT INTRACTABLE: Primary | Chronic | ICD-10-CM

## 2024-11-18 PROCEDURE — 99214 OFFICE O/P EST MOD 30 MIN: CPT | Mod: S$PBB,,, | Performed by: NURSE PRACTITIONER

## 2024-11-18 PROCEDURE — 99213 OFFICE O/P EST LOW 20 MIN: CPT | Mod: PBBFAC | Performed by: NURSE PRACTITIONER

## 2024-11-18 PROCEDURE — 99999 PR PBB SHADOW E&M-EST. PATIENT-LVL III: CPT | Mod: PBBFAC,,, | Performed by: NURSE PRACTITIONER

## 2024-11-18 NOTE — PATIENT INSTRUCTIONS
"Migraines in Adults   The Basics   Written by the doctors and editors at [facility]   What are migraines? -- Migraines are a kind of headache that can also involve other symptoms. Migraines can affect both adults and children. They are more common in women than in men. Migraines often start off mild and then get worse.  What are the symptoms of migraines in adults? -- Symptoms can include:  Headache - The headache gets worse over several hours and is usually throbbing. It often affects 1 side of the head.  Nausea and sometimes vomiting  Feeling sensitive to light and noise - Lying down in a quiet, dark room often helps.  Aura - Some people have something called a migraine "aura." An aura is a symptom or feeling that happens before or during the migraine headache. Each person's aura is different, but in most cases the aura affects the vision. You might see flashing lights, bright spots, or zig-zag lines, or lose part of your vision. Or you might have numbness and tingling of the lips, lower face, and fingers of 1 hand. Some people hear sounds or have ringing in their ears as part of their aura. The aura usually lasts a few minutes to an hour and then goes away, but most often lasts 15 to 30 minutes.  Women who get migraines with aura usually cannot take birth control pills. That's because they might increase the risk of stroke.  Many people get other symptoms of migraine that happen several hours or even a day before the headache. Doctors call these "premonitory" or "prodromal" symptoms. They might include yawning, feeling depressed, irritability, food cravings, constipation, or a stiff neck.  Is there a test for migraines? -- No. There is no test. But your doctor should be able to tell if you have migraines by doing an exam and learning about your symptoms.  Should I see a doctor or nurse? -- Yes. If you think you are having migraines, you should talk to your doctor or nurse. You should also see a doctor or nurse if " "your migraines get worse or more frequent, or if you have new symptoms.  Is there anything I can do to prevent migraines? -- Yes. Some people find that their migraines are triggered by certain things. If you can avoid some of these things, you can lower your chances of getting migraines.  You can also keep a "headache calendar." In the calendar, write down every time you have a migraine and what you ate and did before it started. That way you can find out if there is anything you should avoid eating or doing. You can also write down what medicine you took and whether or not it helped.  Common migraine triggers include:  Stress  Hormonal changes  Skipping meals or not eating enough  Changes in the weather  Sleeping too much or too little  Bright or flashing lights  Drinking alcohol  Certain drinks or foods, such as red wine, aged cheese, and hot dogs  If your migraines are frequent or severe, your doctor can suggest others ways to help prevent them. For example, it might help to learn relaxation techniques and ways to manage stress. There are also medicines that can help.  Some women get migraines just before or during their period. Medicine can help with this, too.  How are migraines treated? -- There are many different medicines that can help with migraines. Your doctor can help you find the best treatment for your situation.  For mild migraines, your doctor might suggest an over-the-counter medicine such as acetaminophen (sample brand name: Tylenol), ibuprofen (sample brand names: Advil, Motrin), or naproxen (sample brand name: Aleve). There is also a medicine that combines acetaminophen, aspirin, and caffeine (sample brand name: Excedrin).  For more severe migraines, there are prescription medicines that can help. Some, such as medicines called triptans, help to relieve the pain from a migraine attack. Other prescription medicines can help to make migraine attacks happen less often. If you have severe nausea or " vomiting with your migraines, there are medicines that can help with that, too.   Do not try to treat frequent migraines on your own with non-prescription pain medicines. Taking non-prescription pain medicines too often can actually cause more headaches later.  What if I want to get pregnant? -- If you want to get pregnant, talk to your doctor or nurse about it before you start trying. Some medicines used to treat and prevent migraines are not safe during pregnancy, so you might need to switch medicines before you get pregnant.  Some women notice that their migraines actually get better during pregnancy and breastfeeding. This is related to hormonal changes in the body.  All topics are updated as new evidence becomes available and our peer review process is complete.  This topic retrieved from [employer] on: Sep 21, 2021.  Topic 431960 Version 5.0  Release: 29.4.2 - C29.263  © 2021 UpToDate, Inc. and/or its affiliates. All rights reserved.  Consumer Information Use and Disclaimer   This information is not specific medical advice and does not replace information you receive from your health care provider. This is only a brief summary of general information. It does NOT include all information about conditions, illnesses, injuries, tests, procedures, treatments, therapies, discharge instructions or life-style choices that may apply to you. You must talk with your health care provider for complete information about your health and treatment options. This information should not be used to decide whether or not to accept your health care provider's advice, instructions or recommendations. Only your health care provider has the knowledge and training to provide advice that is right for you. The use of this information is governed by the [employer] End User License Agreement, available at https://www.[employer].LoveThis/en/solutions/AudioBoo/about/eliane.The use of [employer] content is governed by the [employer] Terms of Use. ©2021  [employer] All rights reserved.  Copyright   © 2021 [employer] and/or its affiliates. All rights reserved.

## 2025-01-17 ENCOUNTER — TELEPHONE (OUTPATIENT)
Dept: NEUROLOGY | Facility: CLINIC | Age: 70
End: 2025-01-17
Payer: OTHER GOVERNMENT

## 2025-05-01 ENCOUNTER — OFFICE VISIT (OUTPATIENT)
Facility: CLINIC | Age: 70
End: 2025-05-01
Payer: OTHER GOVERNMENT

## 2025-05-01 VITALS
SYSTOLIC BLOOD PRESSURE: 126 MMHG | OXYGEN SATURATION: 95 % | HEIGHT: 68 IN | WEIGHT: 211 LBS | DIASTOLIC BLOOD PRESSURE: 76 MMHG | BODY MASS INDEX: 31.98 KG/M2 | HEART RATE: 66 BPM

## 2025-05-01 DIAGNOSIS — M54.9 CHRONIC BACK PAIN GREATER THAN 3 MONTHS DURATION: ICD-10-CM

## 2025-05-01 DIAGNOSIS — M54.16 LUMBAR RADICULOPATHY: ICD-10-CM

## 2025-05-01 DIAGNOSIS — M47.816 LUMBAR FACET ARTHROPATHY: Primary | ICD-10-CM

## 2025-05-01 DIAGNOSIS — G89.29 CHRONIC BACK PAIN GREATER THAN 3 MONTHS DURATION: ICD-10-CM

## 2025-05-01 PROCEDURE — 99214 OFFICE O/P EST MOD 30 MIN: CPT | Mod: ,,, | Performed by: NURSE PRACTITIONER

## 2025-05-01 NOTE — LETTER
This communication is flagged as high priority.         Date 2025    Re:   Wen norwood    :   1955    Dr. Nikolas Varela ,           The above named patient is scheduled to have a Radiofrequency Ablation at [facility] L3-L4     On25.       *Type of Anesthesia: Local MAC  Would there be anything the patient needs to do to Pacemaker prior to sx?    This patient needs surgical clearance from your office for this procedure.  Please perform necessary tests in order for this patient to be medically cleared for surgery. Please send or fax the clearance letter with most recent ECHO, EKG or stress test, to our office as soon as possible.     Our fax number is [phone].          We appreciate your help in getting our patient cleared for surgery.  Please feel free to call our office should you have any questions, [phone]  Fax [phone]            Thank You, Alcides Lilly NP

## 2025-05-01 NOTE — LETTER
May 1, 2025      [facility]  [address]  [place] [address]  Phone: [phone]  Fax: [phone]       Patient: Wen Morris   YOB: 1955  Date of Visit: 05/01/2025    To Whom It May Concern:    Ariela Morris  was at [facility] on 05/01/2025 If you have any questions or concerns, or if I can be of further assistance, please do not hesitate to contact me.    Sincerely,    Alcides LillyNP

## 2025-05-01 NOTE — PROGRESS NOTES
Pain Management Clinic    Subjective:     Chief Complaint: Low-back Pain (Lower back pain that does not radiate.  Pain Scale:  7/10.  The pain has worsened over the past few months.  Patient states he is not taking an medication or non-medication interventions at this time.  No recent surgeries noted.)    Referred by: Mindy Mcfarland He*     History of Present Illness: Wen Morris is a pleasant 70 y.o. male  who presents today as a six-month follow-up visit to re-evaluate pain related to his lumbar axial spine.  He completed his 1st right-sided, radiofrequency ablation right L3-L4 on 10/14/2024 and left RFA L3-L4 on 09/09/2024.  Alejandra reported his lumbar axial spine pain relief lasted almost 7 months and now his pain has returned to the bilateral lumbar axial spine region.  Alejandra would like to repeat his left L3-4 radiofrequency ablation 1st.  He was instructed to ice 20 minutes on 20 minutes off postoperatively as that has anticipated postop swelling that can last anywhere from 2 weeks to one-month.  Alejandra verified understanding.  He also relayed he is continuing to take 81 mg aspirin.  He was not take fish oil or omega-3 supplements or any other blood thinners per report.  He received excellent pain relief in the lumbar axial spine especially with doing outdoor activities yd work walking exercising and just being active were so much easier to complete and enjoy after his 1st set of radiofrequency ablation in these areas.  Now his pain has returned intensely that is located to the lumbar axial spine without radiation down his legs.  It is more intense with prolonged standing as well as egress from sitting to standing and working outside and with chores.  These activities now will elevate his pain score to a 10/10 on the NRS.  His pain score today is 7/10.  He was an independent ambulator with an antalgic guarded gait.  He describes his return of pain to feel very achy and deep with the  "aforementioned activities.  This also is starting to affect his sleep negatively.  Pertinent findings in his CT lumbar spine from 2023 located in  with the original consult shows facet arthropathy throughout most levels of his lumbar axial spine region.  Vital signs:   Visit Vitals  /76 (BP Location: Right arm, Patient Position: Sitting)   Pulse 66   Ht 5' 8" (1.727 m)   Wt 95.7 kg (210 lb 15.7 oz)   SpO2 95%   BMI 32.08 kg/m²      Vitals:    05/01/25 1013   PainSc:   7     Pain Disability Index (PDI): 65       Interventional Pain History  10/14/2024:  Right RFA L3-L4 (6+ months pain relief)  09/09/2024:  Left RFA L3-L4 (6+ months pain relief)         ROS: Back pain     CT lumbar spine 2023:  ( can not receive an MRI as he has a defibrillator that has turned off thus he can only complete a CT)    [facility]         Objective:        Physical Exam      General: Well developed; overweight; A&O x 3; No anxiety/depression; NAD  Mental Status: Oriented to person, palce and time. Displays appropriate mood & affect.  Head: Norm cephalic and atraumatic  Neck:  No cervical paraspinal banding.  Full range of motion with lateral turning and cervical flexion +extension.  Eyes: normal conjunctiva, normal lids, normal pupils  ENT and mouth: normal external ear, nose, and no lesions noted on the lips.  Respiratory: Symmetrical, Unlabored. No dyspnea  CV: normal rhythm and rate. No peripheral edema.   Abdomen: Non-distended     Extremities:  Gen: No cyanosis or tenderness to palpation bilateral upper and lower extremities  Skin: Warm, pink, dry, no rashes, no lesions on the lumbar spine  Strength: 5/5 motor strength bilateral upper and lower extremities  ROM: Full ROM in bilateral knees and ankles without pain or instability.     Neuro:  Gait: no altalgic lean, normal toe and heel raise. Independent ambulator.  DTR's: 2+ in bilateral patellar,   Sensory: Intact " to light touch bilateral  upper and lower extremities     Spine: Normal lordosis. No scoliosis  L-spine ROM:  Limited and painful ROM to flexion, extension, bilateral rotation to lumbar axial spine region.   Straight Leg Raise:  negative bilaterally  SI Joint: No tenderness to palpation bilaterally.    Assessment:      Wen Morris is a pleasant 70 y.o. male  who presents today as a six-month follow-up visit to re-evaluate pain related to his lumbar axial spine.  He completed his 1st right-sided, radiofrequency ablation right L3-L4 on 10/14/2024 and left RFA L3-L4 on 09/09/2024.   reported his lumbar axial spine pain relief lasted almost 7 months and now his pain has returned to the bilateral lumbar axial spine region.  Rose would like to repeat his left L3-4 radiofrequency ablation 1st.  He was instructed to ice 20 minutes on 20 minutes off postoperatively as that has anticipated postop swelling that can last anywhere from 2 weeks to one-month.   verified understanding.  He also relayed he is continuing to take 81 mg aspirin.  He was not take fish oil or omega-3 supplements or any other blood thinners per report.  He received excellent pain relief in the lumbar axial spine especially with doing outdoor activities yd work walking exercising and just being active were so much easier to complete and enjoy after his 1st set of radiofrequency ablation in these areas.  Now his pain has returned intensely that is located to the lumbar axial spine without radiation down his legs.  It is more intense with prolonged standing as well as egress from sitting to standing and working outside and with chores.  These activities now will elevate his pain score to a 10/10 on the NRS.  His pain score today is 7/10.  He was an independent ambulator with an antalgic guarded gait.  He describes his return of pain to feel very achy and deep with the aforementioned activities.  This also is starting to affect his  sleep negatively.  Pertinent findings in his CT lumbar spine from 2023 located in  with the original consult shows facet arthropathy throughout most levels of his lumbar axial spine region.    Plan of care:   Request sent for left L3-L4 RFA.    Patient to hold 81 mg aspirin and meloxicam 7 days prior to procedure and resume postop.  He was not taking any other blood thinners including but not limited to omega-3/fish oil.   will stop his defibrillator and turn it off before the RFA.    I discussed with Dr. Tashia churchill and she agreed his CT lumbar spine from 2023 should be adequate unless he has had any accidents or injuries which he has not.      Patient to follow-up 2 weeks postprocedure to evaluate.  Patient was instructed to ice postoperatively on the left 20 minutes on 20 minutes off as anticipated postoperative swelling can last from 2-4 weeks.  He verified understanding    Encounter Diagnoses   Name Primary?    Lumbar facet arthropathy Yes    Lumbar radiculopathy     Chronic back pain greater than 3 months duration          Plan:     Request sent for left L3-L4 RFA.     will stop his defibrillator and turn it off before the RFA.    I discussed with Dr. Tashia churchill and she agreed his CT lumbar spine from 2023 should be adequate unless he has had any accidents or injuries which he has not.    Patient to follow-up 2 weeks postprocedure to evaluate.  Patient was instructed to ice postoperatively on the left 20 minutes on 20 minutes off as anticipated postoperative swelling can last from 2-4 weeks.  He verified understanding  Wen was seen today for low-back pain.    Diagnoses and all orders for this visit:    Lumbar facet arthropathy    Lumbar radiculopathy    Chronic back pain greater than 3 months duration           Past Medical History:   Diagnosis Date    Arthritis     Bell palsy     Chronic neck pain     Coronary artery disease     DM (diabetes mellitus)     Glaucoma      Headache     Hepatitis B     HTN (hypertension)     Memory loss     Neuralgia     Sleep apnea     does not use machine    Unspecified viral hepatitis B without hepatic coma        Past Surgical History:   Procedure Laterality Date    CARDIAC DEFIBRILLATOR PLACEMENT      Dr. Nikolas Varela    CHOLECYSTECTOMY      COLONOSCOPY      EYE SURGERY      INJECTION OF ANESTHETIC AGENT AROUND MEDIAL BRANCH NERVES INNERVATING LUMBAR FACET JOINT Bilateral 2024    Procedure: Block-nerve-medial branch-lumbar;  Surgeon: Pauline Lala MD;  Location: [facility] OR;  Service: Pain Management;  Laterality: Bilateral;  MBB Malcom L3-5  contact [employer] [facility] [phone] for magnet placement    INJECTION OF ANESTHETIC AGENT AROUND MEDIAL BRANCH NERVES INNERVATING LUMBAR FACET JOINT Bilateral 2024    Procedure: Block-nerve-medial branch-lumbar;  Surgeon: Pauline Lala MD;  Location: [facility] OR;  Service: Pain Management;  Laterality: Bilateral;  MBB Malcom L3-5    NECK SURGERY      RADIOFREQUENCY ABLATION Left 2024    Procedure: Radiofrequency Ablation;  Surgeon: Pauline Lala MD;  Location: [facility] OR;  Service: Pain Management;  Laterality: Left;  [facility])    RADIOFREQUENCY ABLATION Right 10/14/2024    Procedure: RADIOFREQUENCY ABLATION  ////RFA Rt L3-L4;  Surgeon: Pauline Lala MD;  Location: [facility] OR;  Service: Pain Management;  Laterality: Right;    TONSILLECTOMY         Family History   Problem Relation Name Age of Onset    Diabetes Mother      Cancer Father         Social History     Socioeconomic History    Marital status:    Tobacco Use    Smoking status: Former     Current packs/day: 0.00     Types: Cigarettes     Quit date:      Years since quittin.3    Smokeless tobacco: Never   Substance and Sexual Activity    Alcohol use: Yes     Alcohol/week: 3.0 - 4.0 standard drinks of alcohol     Types: 3 - 4 Cans of beer per week     Comment: 1    Drug use: Never    Sexual  "activity: Yes     Partners: Female       Current Medications[1]    Review of patient's allergies indicates:   Allergen Reactions    Corn Swelling     Throat swelling    Benadryl allergy decongestant      hyperactivity    Hydrocodone Other (See Comments)     "I get hyper"    Codeine Anxiety     Other reaction(s): Hyperactive behavior    Corticosteroids (glucocorticoids) Anxiety     hyperactivity                  [1]   Current Outpatient Medications   Medication Sig Dispense Refill    ascorbic acid, vitamin C, (VITAMIN C) 500 MG tablet Take 1 tablet by mouth every morning.      aspirin (ECOTRIN) 81 MG EC tablet Take 1 tablet by mouth every morning.      capsicum 0.075% (ZOSTRIX) 0.075 % topical cream Apply 1 application topically 2 (two) times a day. Apply with gloves      carvediloL (COREG) 25 MG tablet Take 12.5 mg by mouth 2 (two) times daily.      cetirizine (ZYRTEC) 10 MG tablet Take 10 mg by mouth.      chlorpheniramine (CHLOR-TRIMETON) 4 mg tablet Take 4 mg by mouth every 6 (six) hours as needed for Allergies.      cholecalciferol, vitamin D3, 125 mcg (5,000 unit) capsule Take 1 capsule by mouth once daily.      cyclobenzaprine (FLEXERIL) 10 MG tablet Take 10 mg by mouth 3 (three) times daily as needed for Muscle spasms.      donepeziL (ARICEPT) 10 MG tablet Take 10 mg by mouth every evening.      empagliflozin (JARDIANCE) 25 mg tablet Take 25 mg by mouth once daily.      entecavir (BARACLUDE) 1 MG Tab Take 1 mg by mouth once daily.      erenumab-aooe (AIMOVIG) 140 mg/mL autoinjector Inject 1 mL (140 mg total) into the skin every 30 days. 1 mL 12    FLUoxetine 20 MG capsule Take 20 mg by mouth once daily.      fluticasone propionate (FLONASE) 50 mcg/actuation nasal spray by Each Nostril route.      furosemide (LASIX) 20 MG tablet Take 20 mg by mouth once daily.      glipiZIDE (GLUCOTROL) 10 MG tablet Take 10 mg by mouth 2 (two) times daily before meals.      latanoprost 0.005 % ophthalmic solution Place 1 drop " into both eyes every evening.      lisinopriL 10 MG tablet Take 10 mg by mouth 2 (two) times a day.      loratadine (CLARITIN) 10 mg tablet Take 10 mg by mouth once daily.      meloxicam (MOBIC) 15 MG tablet Take 15 mg by mouth once daily.      memantine (NAMENDA) 10 MG Tab Take 5 mg by mouth 2 (two) times daily.      metFORMIN (FORTAMET) 500 mg 24hr tablet Take 500 mg by mouth daily with breakfast.      multivit-min/ferrous fumarate (MULTI VITAMIN ORAL) Take 1 tablet by mouth Daily.      omeprazole (PRILOSEC) 20 MG capsule Take 20 mg by mouth once daily.      propranoloL (INDERAL) 20 MG tablet Take 10 mg by mouth once daily.      rosuvastatin (CRESTOR) 10 MG tablet Take 10 mg by mouth once daily.      tenofovir disoproxil fumarate (VIREAD) 300 mg Tab Take 300 mg by mouth once daily.      zolpidem (AMBIEN) 10 mg Tab Take 5 mg by mouth every evening.      pregabalin (LYRICA) 100 MG capsule Take 1 capsule (100 mg total) by mouth 2 (two) times a day. (Patient not taking: Reported on 5/1/2025) 60 capsule 3     No current facility-administered medications for this visit.     Facility-Administered Medications Ordered in Other Visits   Medication Dose Route Frequency Provider Last Rate Last Admin    lactated ringers infusion   Intravenous Continuous Miriam Gary MD

## 2025-05-14 ENCOUNTER — ANESTHESIA (OUTPATIENT)
Dept: SURGERY | Facility: HOSPITAL | Age: 70
End: 2025-05-14
Payer: OTHER GOVERNMENT

## 2025-05-14 ENCOUNTER — HOSPITAL ENCOUNTER (OUTPATIENT)
Facility: HOSPITAL | Age: 70
Discharge: HOME | End: 2025-05-14
Attending: ANESTHESIOLOGY | Admitting: ANESTHESIOLOGY
Payer: OTHER GOVERNMENT

## 2025-05-14 ENCOUNTER — ANESTHESIA EVENT (OUTPATIENT)
Dept: SURGERY | Facility: HOSPITAL | Age: 70
End: 2025-05-14
Payer: OTHER GOVERNMENT

## 2025-05-14 DIAGNOSIS — M54.9 CHRONIC BACK PAIN GREATER THAN 3 MONTHS DURATION: Primary | ICD-10-CM

## 2025-05-14 DIAGNOSIS — G89.29 CHRONIC BACK PAIN GREATER THAN 3 MONTHS DURATION: Primary | ICD-10-CM

## 2025-05-14 LAB — POCT GLUCOSE: 199 MG/DL (ref 70–110)

## 2025-05-14 PROCEDURE — 63600175 PHARM REV CODE 636 W HCPCS: Performed by: ANESTHESIOLOGY

## 2025-05-14 PROCEDURE — 25000003 PHARM REV CODE 250: Performed by: ANESTHESIOLOGY

## 2025-05-14 PROCEDURE — 63600175 PHARM REV CODE 636 W HCPCS: Performed by: NURSE ANESTHETIST, CERTIFIED REGISTERED

## 2025-05-14 PROCEDURE — 37000008 HC ANESTHESIA 1ST 15 MINUTES: Performed by: ANESTHESIOLOGY

## 2025-05-14 PROCEDURE — 64635 DESTROY LUMB/SAC FACET JNT: CPT | Mod: RT | Performed by: ANESTHESIOLOGY

## 2025-05-14 PROCEDURE — 25000003 PHARM REV CODE 250: Performed by: NURSE ANESTHETIST, CERTIFIED REGISTERED

## 2025-05-14 PROCEDURE — 64635 DESTROY LUMB/SAC FACET JNT: CPT | Mod: RT,,, | Performed by: ANESTHESIOLOGY

## 2025-05-14 RX ORDER — DEXMEDETOMIDINE HYDROCHLORIDE 100 UG/ML
INJECTION, SOLUTION INTRAVENOUS
Status: DISCONTINUED | OUTPATIENT
Start: 2025-05-14 | End: 2025-05-14

## 2025-05-14 RX ORDER — BUPIVACAINE HYDROCHLORIDE 2.5 MG/ML
INJECTION, SOLUTION EPIDURAL; INFILTRATION; INTRACAUDAL; PERINEURAL
Status: DISCONTINUED | OUTPATIENT
Start: 2025-05-14 | End: 2025-05-14 | Stop reason: HOSPADM

## 2025-05-14 RX ORDER — PROCHLORPERAZINE EDISYLATE 5 MG/ML
5 INJECTION INTRAMUSCULAR; INTRAVENOUS EVERY 30 MIN PRN
OUTPATIENT
Start: 2025-05-14

## 2025-05-14 RX ORDER — BUPIVACAINE HYDROCHLORIDE 2.5 MG/ML
INJECTION, SOLUTION EPIDURAL; INFILTRATION; INTRACAUDAL; PERINEURAL
Status: DISCONTINUED
Start: 2025-05-14 | End: 2025-05-14 | Stop reason: HOSPADM

## 2025-05-14 RX ORDER — KETAMINE HYDROCHLORIDE 100 MG/ML
INJECTION, SOLUTION INTRAMUSCULAR; INTRAVENOUS
Status: COMPLETED
Start: 2025-05-14 | End: 2025-05-14

## 2025-05-14 RX ORDER — DIPHENHYDRAMINE HYDROCHLORIDE 50 MG/ML
25 INJECTION, SOLUTION INTRAMUSCULAR; INTRAVENOUS ONCE
OUTPATIENT
Start: 2025-05-14 | End: 2025-05-14

## 2025-05-14 RX ORDER — SODIUM CHLORIDE, SODIUM GLUCONATE, SODIUM ACETATE, POTASSIUM CHLORIDE AND MAGNESIUM CHLORIDE 30; 37; 368; 526; 502 MG/100ML; MG/100ML; MG/100ML; MG/100ML; MG/100ML
INJECTION, SOLUTION INTRAVENOUS CONTINUOUS
OUTPATIENT
Start: 2025-05-14 | End: 2025-06-13

## 2025-05-14 RX ORDER — SODIUM CHLORIDE, SODIUM LACTATE, POTASSIUM CHLORIDE, CALCIUM CHLORIDE 600; 310; 30; 20 MG/100ML; MG/100ML; MG/100ML; MG/100ML
INJECTION, SOLUTION INTRAVENOUS CONTINUOUS
OUTPATIENT
Start: 2025-05-14

## 2025-05-14 RX ORDER — LIDOCAINE HYDROCHLORIDE 10 MG/ML
INJECTION, SOLUTION EPIDURAL; INFILTRATION; INTRACAUDAL; PERINEURAL
Status: DISCONTINUED
Start: 2025-05-14 | End: 2025-05-14 | Stop reason: HOSPADM

## 2025-05-14 RX ORDER — MEPERIDINE HYDROCHLORIDE 25 MG/ML
12.5 INJECTION INTRAMUSCULAR; INTRAVENOUS; SUBCUTANEOUS ONCE AS NEEDED
Refills: 0 | OUTPATIENT
Start: 2025-05-14 | End: 2025-05-15

## 2025-05-14 RX ORDER — PROPOFOL 10 MG/ML
VIAL (ML) INTRAVENOUS
Status: DISCONTINUED | OUTPATIENT
Start: 2025-05-14 | End: 2025-05-14

## 2025-05-14 RX ORDER — KETAMINE HYDROCHLORIDE 100 MG/ML
INJECTION, SOLUTION INTRAMUSCULAR; INTRAVENOUS
Status: DISCONTINUED | OUTPATIENT
Start: 2025-05-14 | End: 2025-05-14

## 2025-05-14 RX ORDER — MIDAZOLAM HYDROCHLORIDE 2 MG/2ML
2 INJECTION, SOLUTION INTRAMUSCULAR; INTRAVENOUS ONCE AS NEEDED
OUTPATIENT
Start: 2025-05-14 | End: 2036-10-09

## 2025-05-14 RX ORDER — TRIAMCINOLONE ACETONIDE 40 MG/ML
INJECTION, SUSPENSION INTRA-ARTICULAR; INTRAMUSCULAR
Status: DISCONTINUED | OUTPATIENT
Start: 2025-05-14 | End: 2025-05-14 | Stop reason: HOSPADM

## 2025-05-14 RX ORDER — LIDOCAINE HYDROCHLORIDE 10 MG/ML
INJECTION, SOLUTION INFILTRATION; PERINEURAL
Status: DISCONTINUED | OUTPATIENT
Start: 2025-05-14 | End: 2025-05-14 | Stop reason: HOSPADM

## 2025-05-14 RX ORDER — TRIAMCINOLONE ACETONIDE 40 MG/ML
INJECTION, SUSPENSION INTRA-ARTICULAR; INTRAMUSCULAR
Status: DISCONTINUED
Start: 2025-05-14 | End: 2025-05-14 | Stop reason: HOSPADM

## 2025-05-14 RX ORDER — SODIUM CHLORIDE 9 MG/ML
INJECTION, SOLUTION INTRAVENOUS CONTINUOUS
OUTPATIENT
Start: 2025-05-14

## 2025-05-14 RX ORDER — LIDOCAINE HYDROCHLORIDE 10 MG/ML
INJECTION, SOLUTION EPIDURAL; INFILTRATION; INTRACAUDAL; PERINEURAL
Status: DISCONTINUED | OUTPATIENT
Start: 2025-05-14 | End: 2025-05-14

## 2025-05-14 RX ORDER — HYDROMORPHONE HYDROCHLORIDE 2 MG/ML
0.4 INJECTION, SOLUTION INTRAMUSCULAR; INTRAVENOUS; SUBCUTANEOUS EVERY 5 MIN PRN
Refills: 0 | OUTPATIENT
Start: 2025-05-14

## 2025-05-14 RX ORDER — ONDANSETRON HYDROCHLORIDE 2 MG/ML
4 INJECTION, SOLUTION INTRAVENOUS DAILY PRN
OUTPATIENT
Start: 2025-05-14

## 2025-05-14 RX ORDER — PROPOFOL 10 MG/ML
VIAL (ML) INTRAVENOUS CONTINUOUS PRN
Status: DISCONTINUED | OUTPATIENT
Start: 2025-05-14 | End: 2025-05-14

## 2025-05-14 RX ORDER — SODIUM CHLORIDE 9 MG/ML
0-999 INJECTION, SOLUTION INTRAVENOUS CONTINUOUS
Status: DISCONTINUED | OUTPATIENT
Start: 2025-05-14 | End: 2025-05-14 | Stop reason: HOSPADM

## 2025-05-14 RX ADMIN — DEXMEDETOMIDINE 10 MCG: 200 INJECTION, SOLUTION INTRAVENOUS at 08:05

## 2025-05-14 RX ADMIN — PROPOFOL 30 MG: 10 INJECTION, EMULSION INTRAVENOUS at 08:05

## 2025-05-14 RX ADMIN — LIDOCAINE HYDROCHLORIDE 20 MG: 10 INJECTION, SOLUTION EPIDURAL; INFILTRATION; INTRACAUDAL; PERINEURAL at 08:05

## 2025-05-14 RX ADMIN — PROPOFOL 80 MCG/KG/MIN: 10 INJECTION, EMULSION INTRAVENOUS at 08:05

## 2025-05-14 RX ADMIN — SODIUM CHLORIDE, POTASSIUM CHLORIDE, SODIUM LACTATE AND CALCIUM CHLORIDE: 600; 310; 30; 20 INJECTION, SOLUTION INTRAVENOUS at 08:05

## 2025-05-14 RX ADMIN — SODIUM CHLORIDE 20 ML/HR: 9 INJECTION, SOLUTION INTRAVENOUS at 08:05

## 2025-05-14 RX ADMIN — KETAMINE HYDROCHLORIDE 10 MG: 100 INJECTION INTRAMUSCULAR; INTRAVENOUS at 08:05

## 2025-05-14 NOTE — ANESTHESIA POSTPROCEDURE EVALUATION
Anesthesia Post Evaluation    Patient: Wen Morris    Procedure(s) Performed: Procedure(s) (LRB):  RADIOFREQUENCY ABLATION / RFA Left L3-4 (Left)    Final Anesthesia Type: general      Patient location during evaluation: PACU  Patient participation: Yes- Able to Participate  Level of consciousness: awake and alert  Post-procedure vital signs: reviewed and stable  Pain management: adequate  Airway patency: patent    PONV status at discharge: No PONV  Anesthetic complications: no      Cardiovascular status: blood pressure returned to baseline and stable  Respiratory status: unassisted  Hydration status: euvolemic  Follow-up not needed.                  No case tracking events are documented in the log.      Pain/Heather Score: No data recorded

## 2025-05-14 NOTE — ANESTHESIA PREPROCEDURE EVALUATION
"                                                                                                             05/14/2025  Wen Morris is a 70 y.o., male.  Pre-operative evaluation for Procedure(s) (LRB):  RADIOFREQUENCY ABLATION / RFA Left L3-4 (Left)    NPO  Date of last solid: 05/13/25  Time of last solid: 1800  Date of last liquid: 05/14/25  Time of last liquid: 0500  Contents of Last Fluid Intake: sip of water with meds this am    /70   Pulse 67   Temp 36.5 °C (97.7 °F) (Oral)   Ht 5' 8" (1.727 m)   Wt 91.6 kg (201 lb 15.1 oz)   SpO2 95%   BMI 30.71 kg/m²     Past Medical History:   Diagnosis Date    Arthritis     Bell palsy     Chronic neck pain     Coronary artery disease     DM (diabetes mellitus)     Glaucoma     Headache     Hepatitis B     HTN (hypertension)     Memory loss     Neuralgia     Sleep apnea     CPAP at home    Unspecified viral hepatitis B without hepatic coma        Problem List[1]    Review of patient's allergies indicates:   Allergen Reactions    Corn Swelling     Throat swelling    Benadryl allergy decongestant      hyperactivity    Hydrocodone Other (See Comments)     "I get hyper"    Codeine Anxiety     Other reaction(s): Hyperactive behavior    Corticosteroids (glucocorticoids) Anxiety     hyperactivity       Current Outpatient Medications   Medication Instructions    ascorbic acid, vitamin C, (VITAMIN C) 500 MG tablet 1 tablet, Every morning    aspirin (ECOTRIN) 81 MG EC tablet 1 tablet, Every morning    capsicum 0.075% (ZOSTRIX) 0.075 % topical cream 1 application , 2 times daily    carvediloL (COREG) 12.5 mg, 2 times daily    cetirizine (ZYRTEC) 10 mg    chlorpheniramine (CHLOR-TRIMETON) 4 mg, Every 6 hours PRN    cholecalciferol, vitamin D3, 125 mcg (5,000 unit) capsule 1 capsule, Daily    cyclobenzaprine (FLEXERIL) 10 mg, 3 times daily PRN    donepeziL (ARICEPT) 10 mg, Nightly    empagliflozin (JARDIANCE) 25 mg, Daily    entecavir (BARACLUDE) 1 mg, Daily    " erenumab-aooe (AIMOVIG) 140 mg, Subcutaneous, Every 30 days    FLUoxetine 20 mg, Daily    fluticasone propionate (FLONASE) 50 mcg/actuation nasal spray by Each Nostril route.    furosemide (LASIX) 20 mg, Daily    glipiZIDE (GLUCOTROL) 10 mg, 2 times daily before meals    latanoprost 0.005 % ophthalmic solution 1 drop, Nightly    lisinopriL 10 mg, 2 times daily    loratadine (CLARITIN) 10 mg, Daily    meloxicam (MOBIC) 15 mg, Daily    memantine (NAMENDA) 5 mg, 2 times daily    metFORMIN (FORTAMET) 500 mg, With breakfast    multivit-min/ferrous fumarate (MULTI VITAMIN ORAL) 1 tablet, Daily    omeprazole (PRILOSEC) 20 mg, Daily    propranoloL (INDERAL) 10 mg, Daily    rosuvastatin (CRESTOR) 10 mg, Daily    tenofovir disoproxil fumarate (VIREAD) 300 mg, Daily    zolpidem (AMBIEN) 5 mg, Nightly       Past Surgical History:   Procedure Laterality Date    CARDIAC DEFIBRILLATOR PLACEMENT  2023    Dr. Nikolas Varela    CHOLECYSTECTOMY      COLONOSCOPY      EYE SURGERY      INJECTION OF ANESTHETIC AGENT AROUND MEDIAL BRANCH NERVES INNERVATING LUMBAR FACET JOINT Bilateral 2/28/2024    Procedure: Block-nerve-medial branch-lumbar;  Surgeon: Pauline Lala MD;  Location: [facility] OR;  Service: Pain Management;  Laterality: Bilateral;  MBB Malcom L3-5  [facility] [phone] for magnet placement    INJECTION OF ANESTHETIC AGENT AROUND MEDIAL BRANCH NERVES INNERVATING LUMBAR FACET JOINT Bilateral 5/8/2024    Procedure: Block-nerve-medial branch-lumbar;  Surgeon: Pauline Lala MD;  Location: [facility] OR;  Service: Pain Management;  Laterality: Bilateral;  MBB Malcom L3-5    NECK SURGERY      RADIOFREQUENCY ABLATION Left 9/9/2024    Procedure: Radiofrequency Ablation;  Surgeon: Pauline Lala MD;  Location: [facility] OR;  Service: Pain Management;  Laterality: Left;  Left RFA L3-L4 ([place])    RADIOFREQUENCY ABLATION Right 10/14/2024    Procedure: RADIOFREQUENCY ABLATION  ////RFA Rt L3-L4;  Surgeon: Pauline Lala MD;   "Location: [address] OR;  Service: Pain Management;  Laterality: Right;    TONSILLECTOMY           No results found for: "WBC", "HGB", "HCT", "MCV", "PLT"       BMP  No results found for: "NA", "K", "CHLORIDE", "CO2", "GLUCOSE", "BUN", "CREATININE", "CALCIUM", "ANIONGAP", "ESTGFRAFRICA", "EGFRNONAA", "BILITOT", "AST", "ALT"     INR  No results for input(s): "PT", "INR", "PROTIME", "APTT" in the last 72 hours.    No results found for: "PREGTESTUR", "PREGSERUM", "HCG", "HCGQUANT"        EKG:  No results found for this or any previous visit.    No results found for this or any previous visit.            Pre-op Assessment    I have reviewed the Patient Summary Reports.    I have reviewed the NPO Status.   I have reviewed the Medications.     Review of Systems  Anesthesia Hx:  No problems with previous Anesthesia             Denies Family Hx of Anesthesia complications.    Denies Personal Hx of Anesthesia complications.                    Cardiovascular:    Pacemaker Hypertension   CAD               No Cardiac Complaints                           Pulmonary:  Pulmonary Normal        No Pulmonary Complaints               Hepatic/GI:      Liver Disease, Hepatitis, B No Current GERD Sx                 Physical Exam  General: Alert and Oriented    Airway:  Mallampati: II   Mouth Opening: Normal  TM Distance: Normal  Tongue: Normal  Neck ROM: Normal ROM    Dental:  Edentulous    Chest/Lungs:  Clear to auscultation, Normal Respiratory Rate    Heart:  Rate: Normal  Rhythm: Regular Rhythm        Anesthesia Plan  Type of Anesthesia, risks & benefits discussed:    Anesthesia Type: Gen Natural Airway  Intra-op Monitoring Plan: Standard ASA Monitors  Post Op Pain Control Plan: multimodal analgesia  Induction:  IV  Airway Plan: Direct  Informed Consent: Informed consent signed with the Patient and all parties understand the risks and agree with anesthesia plan.  All questions answered.   ASA Score: 3  Day of Surgery Review of " History & Physical: H&P Update referred to the surgeon/provider.  Anesthesia Plan Notes: Nasal cannula vs facemask supplemental oxygenation   For patients with BRIAN/obesity, may consider SuperNoval Nasal CPAP    Deactivate AICD  Poss conversion to General LMA/CARLOS discussed          Ready For Surgery From Anesthesia Perspective.     .           [1]   Patient Active Problem List  Diagnosis    Degeneration, intervertebral disc, lumbar    Chronic back pain greater than 3 months duration    Lumbar spondylosis    Lumbar radiculitis    Lumbar degenerative disc disease    Other specified glaucoma    Occipital neuralgia of left side    Episodic migraine    Chronic migraine without aura without status migrainosus, not intractable

## 2025-05-14 NOTE — TRANSFER OF CARE
Anesthesia Transfer of Care Note    Patient: Wen Morris    Procedure(s) Performed: Procedure(s) (LRB):  RADIOFREQUENCY ABLATION / RFA Left L3-4 (Left)    Patient location: PACU    Anesthesia Type: general    Transport from OR: Transported from OR on room air with adequate spontaneous ventilation    Post pain: adequate analgesia    Post assessment: no apparent anesthetic complications    Post vital signs: stable    Level of consciousness: awake    Nausea/Vomiting: no nausea/vomiting    Complications: none    Transfer of care protocol was followed

## 2025-05-14 NOTE — DISCHARGE SUMMARY
[facility] Surgical - Periop Services  Discharge Note  Short Stay    Procedure(s) (LRB):  RADIOFREQUENCY ABLATION / RFA Left L3-4 (Left)      OUTCOME: Patient tolerated treatment/procedure well without complication and is now ready for discharge.    DISPOSITION: Home or Self Care    FINAL DIAGNOSIS:  <principal problem not specified>    FOLLOWUP: In clinic    DISCHARGE INSTRUCTIONS:  No discharge procedures on file.     TIME SPENT ON DISCHARGE: 5 minutes

## 2025-05-14 NOTE — OP NOTE
Right L3-4 medial branch RF Ablation    Pre-Procedure Diagnoses:  1. Chronic pain syndrome  2. Chronic Low back pain  3. Lumbar facet arthropathy    Post-Procedure Diagnoses:  1. Chronic pain syndrome  2. Chronic Low back pain  3. Lumbar facet arthropathy    Anesthesia:  Local and MAC    Estimated Blood Loss:  None    Complications:  None    Informed Consent:  The procedure, risks, benefits, and alternatives were discussed with the patient. There were no contraindications to the procedure. The patient expressed understanding and agreed to proceed. Fully informed written consent was obtained.     Description of the Procedure:  The patient was taken to the operating room. IV access was obtained prior to the start of the procedure. The patient was positioned prone on the fluoroscopy table. Continuous hemodynamic monitoring was initiated and continued throughout the duration of the procedure. The skin overlying the lumbosacral spine was prepped with Chloraprep and draped into a sterile field. Fluoroscopy was used to identify the locations of the right L3 and L4 medial branch nerves at the junctions of the superior articular processes and transverse processes of L4 and L5, respectively. Skin anesthesia was achieved using 1 mL of 1% lidocaine over each injection site. An 18-gauge 150 mm RF needle was slowly inserted and advanced under intermittent fluoroscopic guidance until it made bony contact at the target sites. Proper needle position was confirmed under AP, oblique, and lateral fluoroscopic views. Negative aspiration for blood or CSF was confirmed. An RF probe was placed through each needle. Impedence values were low. Motor testing was performed, which confirmed no stimulation of the lower extremity. Radiofrequency lesioning was then carried out at 80 degrees Celsius for 90 seconds at each level. The probes were withdrawn and each needle was injected with 0.5 ml of 0.25% bupivacaine. There was no pain on injection.  The needles were removed intact and bleeding was nil. Sterile bandages were applied. The patient was taken to the recovery room for further observation in stable condition. The patient was then discharged home without any complications.

## 2025-05-16 VITALS
SYSTOLIC BLOOD PRESSURE: 115 MMHG | TEMPERATURE: 98 F | OXYGEN SATURATION: 94 % | WEIGHT: 201.94 LBS | RESPIRATION RATE: 16 BRPM | HEIGHT: 68 IN | HEART RATE: 69 BPM | BODY MASS INDEX: 30.61 KG/M2 | DIASTOLIC BLOOD PRESSURE: 61 MMHG

## 2025-05-18 NOTE — PROGRESS NOTES
Subjective:       Patient ID: Wen Morris is a 70 y.o. male.    Chief Complaint: occiptal nerualgia  and Migraine    Migraine     He's on aimovig   Last shipment was received by someone but didn't refrigerate it & it was over 7 days  He just reordered it & is waiting for it to be received    He bought a new house & is working on painting & doing some hyacinth    He avg a few mild migraines per month on aimovig & he is happy with the way things are right now    Prev tried: gabapentin, fluoxetine, coreg, lyrica, flexeril, robaxin, nurtec  ONB have prev not provided relief  Tried steroids - didn't work, plus he's diabetic  H/o CAD & reported cva - triptans contraindicated    Review of Systems   A 14pt ROS was reviewed & is negative unless otherwise documented in the HPI    Objective:      Physical Exam    GENERAL: NAD, calm, cooperative, appropriate, smiling today  Awake/alert  Well groomed  RESP: CTAB  HEART: RRR  No LE edema  MENTAL STATUS: oriented, follow commands reliably  SPEECH/LANGUAGE: clear, fluent  CN:  Perrla, eomi, vff, gaze conjugate  No tactile or motor facial asymmetry  Tongue protrudes midline  Motor: no focal weakness  Cerebellar: no tremor or dysmetria  Sensory: normal to tactile stim/vibration  DTRs: normal +2, symmetric  Gait: steady    1. Chronic migraine without aura without status migrainosus, not intractable      PLAN:  Cont aimovig 140/mo  F/u 6mo    Coco Zuniga, AGACNP-BC

## 2025-05-19 ENCOUNTER — OFFICE VISIT (OUTPATIENT)
Dept: NEUROLOGY | Facility: CLINIC | Age: 70
End: 2025-05-19
Payer: OTHER GOVERNMENT

## 2025-05-19 VITALS
BODY MASS INDEX: 30.46 KG/M2 | HEIGHT: 68 IN | SYSTOLIC BLOOD PRESSURE: 124 MMHG | DIASTOLIC BLOOD PRESSURE: 70 MMHG | WEIGHT: 201 LBS

## 2025-05-19 DIAGNOSIS — G43.709 CHRONIC MIGRAINE WITHOUT AURA WITHOUT STATUS MIGRAINOSUS, NOT INTRACTABLE: Primary | ICD-10-CM

## 2025-05-19 PROCEDURE — 99214 OFFICE O/P EST MOD 30 MIN: CPT | Mod: S$PBB,,, | Performed by: NURSE PRACTITIONER

## 2025-05-19 PROCEDURE — 99213 OFFICE O/P EST LOW 20 MIN: CPT | Mod: PBBFAC | Performed by: NURSE PRACTITIONER

## 2025-05-19 PROCEDURE — 99999 PR PBB SHADOW E&M-EST. PATIENT-LVL III: CPT | Mod: PBBFAC,,, | Performed by: NURSE PRACTITIONER

## 2025-05-19 NOTE — PATIENT INSTRUCTIONS
"Migraines in Adults   The Basics   Written by the doctors and editors at [facility]   What are migraines? -- Migraines are a kind of headache that can also involve other symptoms. Migraines can affect both adults and children. They are more common in women than in men. Migraines often start off mild and then get worse.  What are the symptoms of migraines in adults? -- Symptoms can include:  Headache - The headache gets worse over several hours and is usually throbbing. It often affects 1 side of the head.  Nausea and sometimes vomiting  Feeling sensitive to light and noise - Lying down in a quiet, dark room often helps.  Aura - Some people have something called a migraine "aura." An aura is a symptom or feeling that happens before or during the migraine headache. Each person's aura is different, but in most cases the aura affects the vision. You might see flashing lights, bright spots, or zig-zag lines, or lose part of your vision. Or you might have numbness and tingling of the lips, lower face, and fingers of 1 hand. Some people hear sounds or have ringing in their ears as part of their aura. The aura usually lasts a few minutes to an hour and then goes away, but most often lasts 15 to 30 minutes.  Women who get migraines with aura usually cannot take birth control pills. That's because they might increase the risk of stroke.  Many people get other symptoms of migraine that happen several hours or even a day before the headache. Doctors call these "premonitory" or "prodromal" symptoms. They might include yawning, feeling depressed, irritability, food cravings, constipation, or a stiff neck.  Is there a test for migraines? -- No. There is no test. But your doctor should be able to tell if you have migraines by doing an exam and learning about your symptoms.  Should I see a doctor or nurse? -- Yes. If you think you are having migraines, you should talk to your doctor or nurse. You should also see a doctor or nurse if " "your migraines get worse or more frequent, or if you have new symptoms.  Is there anything I can do to prevent migraines? -- Yes. Some people find that their migraines are triggered by certain things. If you can avoid some of these things, you can lower your chances of getting migraines.  You can also keep a "headache calendar." In the calendar, write down every time you have a migraine and what you ate and did before it started. That way you can find out if there is anything you should avoid eating or doing. You can also write down what medicine you took and whether or not it helped.  Common migraine triggers include:  Stress  Hormonal changes  Skipping meals or not eating enough  Changes in the weather  Sleeping too much or too little  Bright or flashing lights  Drinking alcohol  Certain drinks or foods, such as red wine, aged cheese, and hot dogs  If your migraines are frequent or severe, your doctor can suggest others ways to help prevent them. For example, it might help to learn relaxation techniques and ways to manage stress. There are also medicines that can help.  Some women get migraines just before or during their period. Medicine can help with this, too.  How are migraines treated? -- There are many different medicines that can help with migraines. Your doctor can help you find the best treatment for your situation.  For mild migraines, your doctor might suggest an over-the-counter medicine such as acetaminophen (sample brand name: Tylenol), ibuprofen (sample brand names: Advil, Motrin), or naproxen (sample brand name: Aleve). There is also a medicine that combines acetaminophen, aspirin, and caffeine (sample brand name: Excedrin).  For more severe migraines, there are prescription medicines that can help. Some, such as medicines called triptans, help to relieve the pain from a migraine attack. Other prescription medicines can help to make migraine attacks happen less often. If you have severe nausea or " vomiting with your migraines, there are medicines that can help with that, too.   Do not try to treat frequent migraines on your own with non-prescription pain medicines. Taking non-prescription pain medicines too often can actually cause more headaches later.  What if I want to get pregnant? -- If you want to get pregnant, talk to your doctor or nurse about it before you start trying. Some medicines used to treat and prevent migraines are not safe during pregnancy, so you might need to switch medicines before you get pregnant.  Some women notice that their migraines actually get better during pregnancy and breastfeeding. This is related to hormonal changes in the body.  All topics are updated as new evidence becomes available and our peer review process is complete.  This topic retrieved from [employer] (deja mi) on: Sep 21, 2021.  Topic 280978 Version 5.0  Release: 29.4.2 - C29.263  © 2021 UpToDate, Inc. and/or its affiliates. All rights reserved.  Consumer Information Use and Disclaimer   This information is not specific medical advice and does not replace information you receive from your health care provider. This is only a brief summary of general information. It does NOT include all information about conditions, illnesses, injuries, tests, procedures, treatments, therapies, discharge instructions or life-style choices that may apply to you. You must talk with your health care provider for complete information about your health and treatment options. This information should not be used to decide whether or not to accept your health care provider's advice, instructions or recommendations. Only your health care provider has the knowledge and training to provide advice that is right for you. The use of this information is governed by the [employer] End User License Agreement, available at https://www.[employer].Medes[employer]/en/solutions/AI Patents/about/eliane.The use of [employer]de[employer] content is governed by the [employer]de[employer] Terms of Use. ©2021  [employer] All rights reserved.  Copyright   © 2021 [employer] and/or its affiliates. All rights reserved.

## 2025-05-27 NOTE — PROGRESS NOTES
[facility]    Subjective:     Chief Complaint: Post-op Evaluation (Post op Lt RFA 5/14/25 C/O pain level 2, not taking pain meds states procedure helped)    Referred by: Mindy Mcfarland He*     History of Present Illness: Wen Morris is a pleasant 70-year-old male patient who presents as a postoperative follow-up for pain associated with lumbar facet arthropathy and chronic low back pain after completing a left t L3-4 medial branch radiofrequency ablation on 05/14/2025.   denies any pain to his low back since the left radiofrequency ablation was completed.  He does not have any pain on the right low back and thus does not want to repeat a right radiofrequency ablation L3-4 at this time..  Most of the time his pain will only elevate to a 2/10 on the NRS when he is standing, walking bending and doing chores.  These activities would elevate his pain notably prior to the radiofrequency ablation.  He also reports he is not icing as his back feels great.  Overall he is very satisfied with this pain relief he is also interested in following up with me in 6 months to re-evaluate his lumbar facet arthropathy.    History of Present Illness during last office visit with me on 05/01/2025 included the following:   : Wen Morris is a pleasant 70 y.o. male  who presents today as a six-month follow-up visit to re-evaluate pain related to his lumbar axial spine.  He completed his 1st right-sided, radiofrequency ablation right L3-L4 on 10/14/2024 and left RFA L3-L4 on 09/09/2024.  Alejandra reported his lumbar axial spine pain relief lasted almost 7 months and now his pain has returned to the bilateral lumbar axial spine region.  Alejandra would like to repeat his left L3-4 radiofrequency ablation 1st.  He was instructed to ice 20 minutes on 20 minutes off postoperatively as that has anticipated postop swelling that can last anywhere from 2 weeks to one-month.  Alejandra verified understanding.  He  "also relayed he is continuing to take 81 mg aspirin.  He was not take fish oil or omega-3 supplements or any other blood thinners per report.  He received excellent pain relief in the lumbar axial spine especially with doing outdoor activities yd work walking exercising and just being active were so much easier to complete and enjoy after his 1st set of radiofrequency ablation in these areas.  Now his pain has returned intensely that is located to the lumbar axial spine without radiation down his legs.  It is more intense with prolonged standing as well as egress from sitting to standing and working outside and with chores.  These activities now will elevate his pain score to a 10/10 on the NRS.  His pain score today is 7/10.  He was an independent ambulator with an antalgic guarded gait.  He describes his return of pain to feel very achy and deep with the aforementioned activities.  This also is starting to affect his sleep negatively.  Pertinent findings in his CT lumbar spine from 2023 located in  with the original consult shows facet arthropathy throughout most levels of his lumbar axial spine region.     Pertinent PMH: Memory loss, glaucoma, Bell's palsy history, sleep apnea with CPAP, hypertension, CAD, diabetes,   Pertinent PSH:  No spinal surgeries, pacemaker, defibrillator or spinal cord stimulator    Vital signs:   Visit Vitals  /68 (BP Location: Right arm, Patient Position: Sitting)   Pulse 100   Ht 5' 8" (1.727 m)   Wt 88.5 kg (195 lb)   BMI 29.65 kg/m²    There were no vitals filed for this visit.          Interventional Pain History  05/14/2025:  Left t L3-4 RFA  10/14/2024:  Right RFA L3-L4 (6+ months pain relief)  09/09/2024:  Left RFA L3-L4 (6+ months pain relief)          ROS: Back pain     CT lumbar spine 2023:  ( can not receive an MRI as he has a defibrillator that has turned off thus he can only complete a CT)    [facility]           Objective:        Physical Exam        General: Well developed; overweight; A&O x 3; No anxiety/depression; NAD  Mental Status: Oriented to person, palce and time. Displays appropriate mood & affect.  Head: Norm cephalic and atraumatic  Neck:  No cervical paraspinal banding.  Full range of motion with lateral turning and cervical flexion +extension.  Eyes: normal conjunctiva, normal lids, normal pupils  ENT and mouth: normal external ear, nose, and no lesions noted on the lips.  Respiratory: Symmetrical, Unlabored. No dyspnea  CV: normal rhythm and rate. No peripheral edema.   Abdomen: Non-distended     Extremities:  Gen: No cyanosis or tenderness to palpation bilateral upper and lower extremities  Skin: Warm, pink, dry, no rashes, no lesions on the lumbar spine  Strength: 5/5 motor strength bilateral upper and lower extremities  ROM: Full ROM in bilateral knees and ankles without pain or instability.     Neuro:  Gait: no altalgic lean, normal toe and heel raise. Independent ambulator.  Sensory: Intact to light touch bilateral  upper and lower extremities     Spine: Normal lordosis. No scoliosis  L-spine ROM:  Full without pain ROM to flexion, extension, bilateral rotation to lumbar axial spine region.   Straight Leg Raise:  negative bilaterally  SI Joint: No tenderness to palpation bilaterally.      Assessment:     Wen Morris is a pleasant 70-year-old male patient who presents as a postoperative follow-up for pain associated with lumbar facet arthropathy and chronic low back pain after completing a left t L3-4 medial branch radiofrequency ablation on 05/14/2025.   denies any pain to his low back since the left radiofrequency ablation was completed.  He does not have any pain on the right low back and thus does not want to repeat a right radiofrequency ablation L3-4 at this time..  Most of the time his pain will only elevate to a 2/10 on the NRS when he is standing, walking bending and  doing chores.  This would elevate notably prior to the radiofrequency ablation.  Overall he is very satisfied with this pain relief he is also interested in following up with me in 6 months to re-evaluate his lumbar     Plan of care:    will follow-up with me in 6 months to re-evaluate his lumbar facet arthropathy pain      Encounter Diagnoses   Name Primary?    Lumbar facet arthropathy Yes    Chronic back pain greater than 3 months duration     Lumbar radiculopathy          Plan:       Wen was seen today for post-op evaluation.    Diagnoses and all orders for this visit:    Lumbar facet arthropathy    Chronic back pain greater than 3 months duration    Lumbar radiculopathy           Past Medical History:   Diagnosis Date    Arthritis     Bell palsy     Chronic neck pain     Coronary artery disease     DM (diabetes mellitus)     Glaucoma     Headache     Hepatitis B     HTN (hypertension)     Memory loss     Neuralgia     Sleep apnea     CPAP at home    Unspecified viral hepatitis B without hepatic coma        Past Surgical History:   Procedure Laterality Date    CARDIAC DEFIBRILLATOR PLACEMENT  2023    Dr. Nikolas Varela    CHOLECYSTECTOMY      COLONOSCOPY      EYE SURGERY      INJECTION OF ANESTHETIC AGENT AROUND MEDIAL BRANCH NERVES INNERVATING LUMBAR FACET JOINT Bilateral 2/28/2024    Procedure: Block-nerve-medial branch-lumbar;  Surgeon: Pauline Lala MD;  Location: [facility] OR;  Service: Pain Management;  Laterality: Bilateral;  MBB Malcom L3-5  contact [facility] [phone] for magnet placement    INJECTION OF ANESTHETIC AGENT AROUND MEDIAL BRANCH NERVES INNERVATING LUMBAR FACET JOINT Bilateral 5/8/2024    Procedure: Block-nerve-medial branch-lumbar;  Surgeon: Pauline Lala MD;  Location: [facility] OR;  Service: Pain Management;  Laterality: Bilateral;  MBB Malcom L3-5    NECK SURGERY      RADIOFREQUENCY ABLATION Left 9/9/2024    Procedure: Radiofrequency Ablation;  Surgeon: Pauline Lala  "MD FILIBERTO;  Location: [facility] OR;  Service: Pain Management;  Laterality: Left;  [facility] L[facility]-L4 ([facility])    RADIOFREQUENCY ABLATION Right 10/14/2024    Procedure: RADIOFREQUENCY ABLATION  ////RFA Rt L3-L4;  Surgeon: Pauline Lala MD;  [address];  Service: Pain Management;  Laterality: Right;    RADIOFREQUENCY ABLATION Left 2025    Procedure: RADIOFREQUENCY ABLATION / RFA Left L3-4;  Surgeon: Pauline Lala MD;  [facility];  Service: Pain Management;  Laterality: Left;  RFA LEFT L3-4    TONSILLECTOMY         Family History   Problem Relation Name Age of Onset    Diabetes Mother      Cancer Father         Social History     Socioeconomic History    Marital status:    Tobacco Use    Smoking status: Former     Current packs/day: 0.00     Types: Cigarettes     Quit date:      Years since quittin.4    Smokeless tobacco: Never   Substance and Sexual Activity    Alcohol use: Yes     Alcohol/week: 3.0 - 4.0 standard drinks of alcohol     Types: 3 - 4 Cans of beer per week     Comment: 1    Drug use: Never    Sexual activity: Yes     Partners: Female       Current Medications[1]    Review of patient's allergies indicates:   Allergen Reactions    Corn Swelling     Throat swelling    Benadryl allergy decongestant      hyperactivity    Hydrocodone Other (See Comments)     "I get hyper"    Codeine Anxiety     Other reaction(s): Hyperactive behavior    Corticosteroids (glucocorticoids) Anxiety     hyperactivity    Diphenhydramine hcl Anxiety     Other reaction(s): Hyperactive behavior                  [1]   Current Outpatient Medications   Medication Sig Dispense Refill    albuterol (PROVENTIL/VENTOLIN HFA) 90 mcg/actuation inhaler as needed.      ascorbic acid, vitamin C, (VITAMIN C) 500 MG tablet Take 1 tablet by mouth every morning.      aspirin (ECOTRIN) 81 MG EC tablet Take 1 tablet by mouth every morning.      betamethasone dipropionate (BETANATE) 0.05 % ointment Apply topically.      " capsicum 0.075% (ZOSTRIX) 0.075 % topical cream Apply 1 application topically 2 (two) times a day. Apply with gloves      carvediloL (COREG) 25 MG tablet Take 12.5 mg by mouth 2 (two) times daily.      cetirizine (ZYRTEC) 10 MG tablet Take 10 mg by mouth.      chlorpheniramine (CHLOR-TRIMETON) 4 mg tablet Take 4 mg by mouth every 6 (six) hours as needed for Allergies.      cholecalciferol, vitamin D3, 125 mcg (5,000 unit) capsule Take 1 capsule by mouth once daily.      cyclobenzaprine (FLEXERIL) 10 MG tablet Take 10 mg by mouth 3 (three) times daily as needed for Muscle spasms.      donepeziL (ARICEPT) 10 MG tablet Take 10 mg by mouth every evening.      empagliflozin (JARDIANCE) 25 mg tablet Take 25 mg by mouth once daily.      entecavir (BARACLUDE) 1 MG Tab Take 1 mg by mouth once daily.      erenumab-aooe (AIMOVIG) 140 mg/mL autoinjector Inject 1 mL (140 mg total) into the skin every 30 days. 1 mL 12    FLUoxetine 20 MG capsule Take 20 mg by mouth once daily.      fluticasone propionate (FLONASE) 50 mcg/actuation nasal spray by Each Nostril route.      furosemide (LASIX) 20 MG tablet Take 20 mg by mouth once daily.      glipiZIDE (GLUCOTROL) 10 MG tablet Take 10 mg by mouth 2 (two) times daily before meals.      ketoconazole (NIZORAL) 2 % shampoo Apply topically.      latanoprost 0.005 % ophthalmic solution Place 1 drop into both eyes every evening.      lisinopriL 10 MG tablet Take 10 mg by mouth 2 (two) times a day.      loratadine (CLARITIN) 10 mg tablet Take 10 mg by mouth once daily.      meloxicam (MOBIC) 15 MG tablet Take 15 mg by mouth once daily.      memantine (NAMENDA) 10 MG Tab Take 5 mg by mouth 2 (two) times daily.      metFORMIN (FORTAMET) 500 mg 24hr tablet Take 500 mg by mouth daily with breakfast.      multivit-min/ferrous fumarate (MULTI VITAMIN ORAL) Take 1 tablet by mouth Daily.      omeprazole (PRILOSEC) 20 MG capsule Take 20 mg by mouth once daily.      polyvinyl alcohol, artificial tears,  (LIQUIFILM TEARS) 1.4 % ophthalmic solution Apply to eye.      propranoloL (INDERAL) 20 MG tablet Take 10 mg by mouth once daily.      rosuvastatin (CRESTOR) 10 MG tablet Take 10 mg by mouth once daily.      tamsulosin (FLOMAX) 0.4 mg Cap Take 0.4 mg by mouth.      tenofovir disoproxil fumarate (VIREAD) 300 mg Tab Take 300 mg by mouth once daily.      zolpidem (AMBIEN) 10 mg Tab Take 5 mg by mouth every evening.       No current facility-administered medications for this visit.

## 2025-05-28 ENCOUNTER — OFFICE VISIT (OUTPATIENT)
Facility: CLINIC | Age: 70
End: 2025-05-28
Payer: OTHER GOVERNMENT

## 2025-05-28 VITALS
SYSTOLIC BLOOD PRESSURE: 127 MMHG | WEIGHT: 195 LBS | HEIGHT: 68 IN | HEART RATE: 100 BPM | BODY MASS INDEX: 29.55 KG/M2 | DIASTOLIC BLOOD PRESSURE: 68 MMHG

## 2025-05-28 DIAGNOSIS — M54.16 LUMBAR RADICULOPATHY: ICD-10-CM

## 2025-05-28 DIAGNOSIS — M54.9 CHRONIC BACK PAIN GREATER THAN 3 MONTHS DURATION: ICD-10-CM

## 2025-05-28 DIAGNOSIS — M47.816 LUMBAR FACET ARTHROPATHY: Primary | ICD-10-CM

## 2025-05-28 DIAGNOSIS — G89.29 CHRONIC BACK PAIN GREATER THAN 3 MONTHS DURATION: ICD-10-CM

## 2025-05-28 PROCEDURE — 99214 OFFICE O/P EST MOD 30 MIN: CPT | Mod: ,,, | Performed by: NURSE PRACTITIONER

## 2025-05-28 RX ORDER — KETOCONAZOLE 20 MG/ML
SHAMPOO, SUSPENSION TOPICAL
COMMUNITY
Start: 2025-04-23

## 2025-05-28 RX ORDER — BETAMETHASONE DIPROPIONATE 0.5 MG/G
OINTMENT TOPICAL
COMMUNITY
Start: 2024-11-12

## 2025-05-28 RX ORDER — TAMSULOSIN HYDROCHLORIDE 0.4 MG/1
0.4 CAPSULE ORAL
COMMUNITY
Start: 2025-05-12

## 2025-05-28 RX ORDER — POLYVINYL ALCOHOL 14 MG/ML
SOLUTION/ DROPS OPHTHALMIC
COMMUNITY
Start: 2025-04-16

## 2025-05-28 RX ORDER — ALBUTEROL SULFATE 90 UG/1
INHALANT RESPIRATORY (INHALATION)
COMMUNITY
Start: 2025-01-24

## 2025-05-28 NOTE — LETTER
May 28, 2025      [facility]  [address]  [place] [address]  Phone: [phone]  Fax: [phone]       Patient: Wen Morris   YOB: 1955  Date of Visit: 05/28/2025    To Whom It May Concern:    Ariela Morris  was at [facility] on 05/28/2025. If you have any questions or concerns, or if I can be of further assistance, please do not hesitate to contact me.    Sincerely,    Alcides Lilly NP

## 2025-08-05 ENCOUNTER — TELEPHONE (OUTPATIENT)
Dept: NEUROLOGY | Facility: CLINIC | Age: 70
End: 2025-08-05
Payer: OTHER GOVERNMENT

## 2025-08-05 NOTE — TELEPHONE ENCOUNTER
----- Message from Nurse Jen sent at 2025  3:43 PM CDT -----  Regarding: [facility] APPT MESSAGE  ue 5-Aug-25 12:40p TAKEN    From:        Wen Morris  Phone:       [phone]  Patient:     Self  :         57  RegDr:      Dr Liborio Schwab  Ref:         when & what time is his appointment?    Subject:          Patient Calls  ClrID:    [phone]

## (undated) DEVICE — BANDAGE SHEER STRIP 3/4X3IN

## (undated) DEVICE — Device

## (undated) DEVICE — NDL FLTR 5MCRN BLNT TIP 18GX1

## (undated) DEVICE — DRAPE FULL SHEET 70X100IN

## (undated) DEVICE — SET SMARTSITE EXT SMALLBORE NF

## (undated) DEVICE — SYR 10CC LUER LOCK

## (undated) DEVICE — DRAPE MEDIUM SHEET 40X70IN

## (undated) DEVICE — DRAPE UTILITY W/ TAPE 20X30IN

## (undated) DEVICE — POSITIONER HEAD ADULT

## (undated) DEVICE — SYR W NDL BLN FILL 5ML 18GX1.5

## (undated) DEVICE — TOWEL OR DISP STRL BLUE 4/PK

## (undated) DEVICE — APPLICATOR CHLORAPREP ORN 26ML

## (undated) DEVICE — KIT SURGICAL TURNOVER

## (undated) DEVICE — COVER TABLE HVY DTY 60X90IN

## (undated) DEVICE — NDL QUINCKE S/SU 22GA 5IN

## (undated) DEVICE — NDL SAFETY 25G X 1.5 ECLIPSE

## (undated) DEVICE — NDL SYR 10ML 18X1.5 LL BLUNT

## (undated) DEVICE — GLOVE SIGNATURE MICRO LTX 6.5

## (undated) DEVICE — NDL HYPO REG 25G X 1 1/2

## (undated) DEVICE — NDL SPINAL 22GA 3.5 IN QUINCKE

## (undated) DEVICE — ADAPTER DUAL NSL LUER M-M 7FT

## (undated) DEVICE — SYR DISP LL 5CC

## (undated) DEVICE — GLOVE PROTEXIS PI CRM 6.5

## (undated) DEVICE — CANNULA VENOM RF 18GX100MM

## (undated) DEVICE — SYR 3CC LUER LOC

## (undated) DEVICE — NDL QUINCKE S/SU 22GA 7IN